# Patient Record
Sex: FEMALE | Race: WHITE | NOT HISPANIC OR LATINO | Employment: UNEMPLOYED | URBAN - METROPOLITAN AREA
[De-identification: names, ages, dates, MRNs, and addresses within clinical notes are randomized per-mention and may not be internally consistent; named-entity substitution may affect disease eponyms.]

---

## 2017-01-05 ENCOUNTER — COMMUNICATION - HEALTHEAST (OUTPATIENT)
Dept: NEUROLOGY | Facility: CLINIC | Age: 57
End: 2017-01-05

## 2017-01-25 ENCOUNTER — HOSPITAL ENCOUNTER (OUTPATIENT)
Dept: NEUROLOGY | Facility: CLINIC | Age: 57
Setting detail: THERAPIES SERIES
Discharge: STILL ADMITTED OR ADMINISTRATIVE | End: 2017-01-25
Attending: NURSE PRACTITIONER

## 2017-01-25 DIAGNOSIS — F06.4 ANXIETY DISORDER DUE TO MEDICAL CONDITION: ICD-10-CM

## 2017-02-16 ENCOUNTER — HOSPITAL ENCOUNTER (OUTPATIENT)
Dept: NEUROLOGY | Facility: CLINIC | Age: 57
Setting detail: THERAPIES SERIES
Discharge: STILL ADMITTED OR ADMINISTRATIVE | End: 2017-02-16
Attending: NURSE PRACTITIONER

## 2017-02-16 DIAGNOSIS — F98.8 ADD (ATTENTION DEFICIT DISORDER): ICD-10-CM

## 2017-03-09 ENCOUNTER — HOSPITAL ENCOUNTER (OUTPATIENT)
Dept: NEUROLOGY | Facility: CLINIC | Age: 57
Setting detail: THERAPIES SERIES
Discharge: STILL ADMITTED OR ADMINISTRATIVE | End: 2017-03-09

## 2017-03-09 ENCOUNTER — HOSPITAL ENCOUNTER (OUTPATIENT)
Dept: NEUROLOGY | Facility: CLINIC | Age: 57
Setting detail: THERAPIES SERIES
Discharge: STILL ADMITTED OR ADMINISTRATIVE | End: 2017-03-09
Attending: NURSE PRACTITIONER

## 2017-03-09 DIAGNOSIS — F43.23 ADJUSTMENT DISORDER WITH MIXED ANXIETY AND DEPRESSED MOOD: ICD-10-CM

## 2017-03-09 DIAGNOSIS — F33.1 MAJOR DEPRESSIVE DISORDER, RECURRENT EPISODE, MODERATE (H): ICD-10-CM

## 2017-03-09 DIAGNOSIS — F98.8 ADD (ATTENTION DEFICIT DISORDER): ICD-10-CM

## 2017-03-09 DIAGNOSIS — F32.A DEPRESSION, UNSPECIFIED DEPRESSION TYPE: ICD-10-CM

## 2017-03-13 ENCOUNTER — AMBULATORY - HEALTHEAST (OUTPATIENT)
Dept: SPEECH THERAPY | Age: 57
End: 2017-03-13

## 2017-04-19 ENCOUNTER — HOSPITAL ENCOUNTER (OUTPATIENT)
Dept: NEUROLOGY | Facility: CLINIC | Age: 57
Setting detail: THERAPIES SERIES
Discharge: STILL ADMITTED OR ADMINISTRATIVE | End: 2017-04-19
Attending: NURSE PRACTITIONER

## 2017-04-19 DIAGNOSIS — F07.81 POST CONCUSSION SYNDROME: ICD-10-CM

## 2017-04-19 DIAGNOSIS — G44.309 POST-CONCUSSION HEADACHE: ICD-10-CM

## 2017-05-10 ENCOUNTER — HOSPITAL ENCOUNTER (OUTPATIENT)
Dept: NEUROLOGY | Facility: CLINIC | Age: 57
Setting detail: THERAPIES SERIES
Discharge: STILL ADMITTED OR ADMINISTRATIVE | End: 2017-05-10
Attending: NURSE PRACTITIONER

## 2017-05-10 DIAGNOSIS — F43.23 ADJUSTMENT DISORDER WITH MIXED ANXIETY AND DEPRESSED MOOD: ICD-10-CM

## 2017-05-31 ENCOUNTER — HOSPITAL ENCOUNTER (OUTPATIENT)
Dept: NEUROLOGY | Facility: CLINIC | Age: 57
Setting detail: THERAPIES SERIES
Discharge: STILL ADMITTED OR ADMINISTRATIVE | End: 2017-05-31
Attending: NURSE PRACTITIONER

## 2017-05-31 DIAGNOSIS — G44.309 POST-CONCUSSION HEADACHE: ICD-10-CM

## 2017-05-31 DIAGNOSIS — F07.81 POST CONCUSSION SYNDROME: ICD-10-CM

## 2017-05-31 DIAGNOSIS — F43.23 ADJUSTMENT DISORDER WITH MIXED ANXIETY AND DEPRESSED MOOD: ICD-10-CM

## 2017-06-20 ENCOUNTER — COMMUNICATION - HEALTHEAST (OUTPATIENT)
Dept: NEUROLOGY | Facility: CLINIC | Age: 57
End: 2017-06-20

## 2017-06-20 DIAGNOSIS — F06.4 ANXIETY DISORDER DUE TO MEDICAL CONDITION: ICD-10-CM

## 2017-08-14 ENCOUNTER — COMMUNICATION - HEALTHEAST (OUTPATIENT)
Dept: NEUROLOGY | Facility: CLINIC | Age: 57
End: 2017-08-14

## 2017-08-14 DIAGNOSIS — F06.4 ANXIETY DISORDER DUE TO MEDICAL CONDITION: ICD-10-CM

## 2017-12-06 ENCOUNTER — COMMUNICATION - HEALTHEAST (OUTPATIENT)
Dept: NEUROLOGY | Facility: CLINIC | Age: 57
End: 2017-12-06

## 2017-12-06 DIAGNOSIS — F43.22 ADJUSTMENT DISORDER WITH ANXIETY: ICD-10-CM

## 2017-12-07 ENCOUNTER — COMMUNICATION - HEALTHEAST (OUTPATIENT)
Dept: NEUROLOGY | Facility: CLINIC | Age: 57
End: 2017-12-07

## 2017-12-07 DIAGNOSIS — F06.4 ANXIETY DISORDER DUE TO MEDICAL CONDITION: ICD-10-CM

## 2018-01-21 ENCOUNTER — COMMUNICATION - HEALTHEAST (OUTPATIENT)
Dept: NEUROLOGY | Facility: CLINIC | Age: 58
End: 2018-01-21

## 2018-01-21 DIAGNOSIS — F43.23 ADJUSTMENT DISORDER WITH MIXED ANXIETY AND DEPRESSED MOOD: ICD-10-CM

## 2019-10-30 ENCOUNTER — OFFICE VISIT (OUTPATIENT)
Dept: URGENT CARE | Facility: URGENT CARE | Age: 59
End: 2019-10-30
Payer: COMMERCIAL

## 2019-10-30 VITALS
TEMPERATURE: 98.2 F | HEART RATE: 60 BPM | OXYGEN SATURATION: 99 % | SYSTOLIC BLOOD PRESSURE: 102 MMHG | WEIGHT: 245 LBS | BODY MASS INDEX: 35.07 KG/M2 | DIASTOLIC BLOOD PRESSURE: 68 MMHG | HEIGHT: 70 IN | RESPIRATION RATE: 12 BRPM

## 2019-10-30 DIAGNOSIS — J22 LRTI (LOWER RESPIRATORY TRACT INFECTION): Primary | ICD-10-CM

## 2019-10-30 PROCEDURE — 99204 OFFICE O/P NEW MOD 45 MIN: CPT | Performed by: INTERNAL MEDICINE

## 2019-10-30 RX ORDER — AZITHROMYCIN 250 MG/1
TABLET, FILM COATED ORAL
Qty: 6 TABLET | Refills: 0 | Status: SHIPPED | OUTPATIENT
Start: 2019-10-30 | End: 2020-08-15

## 2019-10-30 RX ORDER — CODEINE PHOSPHATE AND GUAIFENESIN 10; 100 MG/5ML; MG/5ML
1-2 SOLUTION ORAL
Qty: 120 ML | Refills: 0 | Status: SHIPPED | OUTPATIENT
Start: 2019-10-30 | End: 2020-08-15

## 2019-10-30 ASSESSMENT — ENCOUNTER SYMPTOMS
WHEEZING: 0
ADENOPATHY: 0
NAUSEA: 0
SINUS PRESSURE: 1
SLEEP DISTURBANCE: 1
EYE DISCHARGE: 0
MYALGIAS: 0
HEADACHES: 1
FEVER: 0
SHORTNESS OF BREATH: 0

## 2019-10-30 ASSESSMENT — MIFFLIN-ST. JEOR: SCORE: 1763.62

## 2019-10-30 NOTE — PROGRESS NOTES
SUBJECTIVE:   Gracie Parra is a 59 year old female presenting with a chief complaint of   Chief Complaint   Patient presents with     Urgent Care     URI     sick with coughing over 2 weeks, congestion, headache and lack of appetite, sneezing.        She is a new patient of [facility].    URI Adult    Onset of symptoms was 2 week(s) ago.  Course of illness is worsening.      Current and Associated symptoms: cough - productive green mucous, fatigue   Treatment measures tried include OTC Cough med.  Predisposing factors include ill contact: Family member  and HX of asthma.        Review of Systems   Constitutional: Negative for fever.   HENT: Positive for sinus pressure.    Eyes: Negative for discharge.   Respiratory: Negative for shortness of breath and wheezing.    Cardiovascular: Negative for chest pain.   Gastrointestinal: Negative for nausea.   Genitourinary: Negative for decreased urine volume.   Musculoskeletal: Negative for myalgias.   Skin: Negative for rash.   Allergic/Immunologic: Negative for environmental allergies.   Neurological: Positive for headaches.   Hematological: Negative for adenopathy.   Psychiatric/Behavioral: Positive for sleep disturbance.       Past Medical History:   Diagnosis Date     ADHD      Asthma      Depression      Hypertension      Psoriasis      Family History   Problem Relation Age of Onset     Brain Cancer Mother      Alzheimer Disease Father      Current Outpatient Medications   Medication Sig Dispense Refill     azithromycin (ZITHROMAX) 250 MG tablet Two tablets first day, then one tablet daily for four days. 6 tablet 0     guaiFENesin-codeine (ROBITUSSIN AC) 100-10 MG/5ML solution Take 5-10 mLs by mouth nightly as needed for cough 120 mL 0     metoprolol (TOPROL XL) 100 MG 24 hr tablet Take 200 mg by mouth daily.       Naproxen 500 MG TBEC Take  by mouth daily.       buPROPion (WELLBUTRIN XL) 300 MG 24 hr tablet Take 1 tablet by mouth every morning. (Patient not  "taking: Reported on 10/30/2019) 90 tablet 3     busPIRone (BUSPAR) 15 MG tablet Take 1 tablet by mouth 2 times daily. (Patient not taking: Reported on 10/30/2019) 180 tablet 1     cholecalciferol (VITAMIN D) 1000 UNIT tablet Take 2 tablets by mouth daily. (Patient not taking: Reported on 10/30/2019) 200 tablet 3     methylphenidate (CONCERTA) 36 MG CR tablet Take 2 tablets by mouth every morning. 60 tablet 0     methylphenidate (CONCERTA) 36 MG CR tablet Take 2 tablets by mouth every morning. 60 tablet 0     methylphenidate (CONCERTA) 36 MG CR tablet Take 2 tablets by mouth every morning. 60 tablet 0     methylphenidate (CONCERTA) 36 MG CR tablet Take 2 tablets by mouth every morning. (Patient not taking: Reported on 10/30/2019) 60 tablet 0     venlafaxine (EFFEXOR-ER) 225 MG TB24 Take 1 tablet by mouth daily (with breakfast). (Patient not taking: Reported on 10/30/2019) 90 each 0     Social History     Tobacco Use     Smoking status: Never Smoker     Smokeless tobacco: Never Used   Substance Use Topics     Alcohol use: Yes     Alcohol/week: 0.0 standard drinks     Comment: 4 days/month: 1 Etoh drink at time.       OBJECTIVE  /68   Pulse 60   Temp 98.2  F (36.8  C) (Oral)   Resp 12   Ht 1.765 m (5' 9.5\")   Wt 111.1 kg (245 lb)   SpO2 99%   BMI 35.66 kg/m      Physical Exam  Vitals signs reviewed.   Constitutional:       Appearance: Normal appearance.   HENT:      Right Ear: Tympanic membrane normal.      Left Ear: Tympanic membrane normal.      Nose: Nose normal.      Comments: Mild sinus tenderness with palpation maxillary and frontal     Mouth/Throat:      Mouth: Mucous membranes are moist.      Pharynx: No oropharyngeal exudate.   Eyes:      General:         Right eye: No discharge.         Left eye: No discharge.      Conjunctiva/sclera: Conjunctivae normal.   Neck:      Musculoskeletal: No muscular tenderness.   Cardiovascular:      Rate and Rhythm: Normal rate and regular rhythm.      Pulses: " Normal pulses.      Heart sounds: Normal heart sounds.   Pulmonary:      Effort: Pulmonary effort is normal.      Breath sounds: Normal breath sounds.   Abdominal:      Palpations: Abdomen is soft.      Tenderness: There is no tenderness.   Lymphadenopathy:      Cervical: No cervical adenopathy.   Skin:     Findings: No rash.   Neurological:      Mental Status: She is alert.         Labs:  No results found for this or any previous visit (from the past 24 hour(s)).    X-Ray was not done.    ASSESSMENT:      ICD-10-CM    1. LRTI (lower respiratory tract infection) J22 guaiFENesin-codeine (ROBITUSSIN AC) 100-10 MG/5ML solution     azithromycin (ZITHROMAX) 250 MG tablet        Medical Decision Making:    Differential Diagnosis:  URI Adult/Peds:  Asthma exacerbation, Bronchitis-viral and Pneumonia    Serious Comorbid Conditions:  Adult:  Asthma     Currently no active wheezing.  Patient has a very old inhaler.  I did offer her albuterol inhaler and she declined.  She did not feel that she needed one with this illness    PLAN:    URI Adult:  Tylenol, Ibuprofen, Fluids, Rest, Saline gargles and   zpak   Robitussin AC bedtime    Followup:    If not improving or if condition worsens, follow up with your Primary Care Provider

## 2020-08-13 ENCOUNTER — VIRTUAL VISIT (OUTPATIENT)
Dept: FAMILY MEDICINE | Facility: OTHER | Age: 60
End: 2020-08-13
Payer: COMMERCIAL

## 2020-08-13 PROCEDURE — 99421 OL DIG E/M SVC 5-10 MIN: CPT | Performed by: PHYSICIAN ASSISTANT

## 2020-08-13 NOTE — PROGRESS NOTES
"Date: 2020 09:02:17  Clinician: Lee Covarrubias  Clinician NPI: [id number]  Patient: Gracie Parra  Patient : 1960  Patient Address: [address], [place] [address]  Patient Phone: [phone]  Visit Protocol: Ear pain  Patient Summary:  Gracie is a 60 year old ( : 1960 ) female who initiated a Visit for swimmer's ear (outer ear infection). When asked the question \"Please sign me up to receive news, health information and promotions from [employer].\", Gracie responded \"No\".    Gracie uploaded images of her ear(s).   Gracie reports that her ear pain started 2-4 days ago. The ear pain is located outside (external surface) and inside the right ear.   In addition to the ear pain, Gracie is experiencing redness, a feeling of fullness, and tenderness in the ear(s). Her ear(s) is tender to the touch on the ear canal and mastoid process.   Symptom Details   Pain: Gracie is experiencing moderate pain (4-6 on a 10 point pain scale). It gets worse when she eats or chews. It does not get worse when she gently pulls on the earlobe(s).    Gracie denies itchiness in the ear(s). She also denies feeling feverish, the possibility of a foreign object in the ear(s), ever having ear tubes, and having fluid draining from the ear(s).   Precipitating events  Gracie reports swimming within the past week. Additionally, Gracie has experienced a recent injury in the area around her ear. Injury as reported by the patient (free text): Psoriasis scab    Gracie denies flying within the past week.   Pertinent medical history    Weight: 240 lbs   She does not smoke or use smokeless tobacco.   Weight: 240 lbs    MEDICATIONS: Naprosyn oral, metoprolol succinate oral, ALLERGIES: NKDA  Clinician Response:  Dear Gracie,   Based upon the information you provided, you may have otitis externa. External otitis, also known as swimmer's ear, is a condition that occurs when the ear canal becomes irritated or infected.   " Medication information  I am prescribing:     Neomycin-polymyxin-hydrocortisone 1% otic ear drops. Instill 4 drops into affected ear(s) 3 times per day for 7 days. There are no refills with this prescription.   Instructions for using eardrops:     Lie on your side or tilt your head    Insert the drops into your ear canal    Lie on your side or insert a cotton ball in the ear canal for 5 minutes    Use the medication for the number of days recommended, even if you begin to feel better within a few days after starting the drops     Self care  Avoid getting water inside your ear while you are being treated for swimmer's ear.  Use a cotton ball coated with petroleum jelly to protect your ears when bathing and showering.  Do not go swimming or diving for the next 7-10 days.  Do not wear headphones or hearing aid in the infected ears until your symptoms improve.  When to seek care  Please make an appointment to be seen in a clinic or urgent care if any of the following occur:     Symptoms do not improve within 2 days    New symptoms develop or symptoms becomes worse      Diagnosis: Otitis externa  Diagnosis ICD: H60.399  Prescription: neomycin-polymyxin-HC 3.5-10,000-1 mg/mL-unit/mL-% otic (ear) drops,suspension 1 10 ml dropper bottle, 7 days supply. Instill 4 drops into affected ear(s) 3 times per day for 7 days. Refills: 0, Refill as needed: no, Allow substitutions: yes  Pharmacy: [id number] - [phone] - [address], [place] [address]

## 2020-08-15 ENCOUNTER — OFFICE VISIT (OUTPATIENT)
Dept: URGENT CARE | Facility: URGENT CARE | Age: 60
End: 2020-08-15
Payer: COMMERCIAL

## 2020-08-15 VITALS
WEIGHT: 245 LBS | SYSTOLIC BLOOD PRESSURE: 133 MMHG | TEMPERATURE: 98.8 F | DIASTOLIC BLOOD PRESSURE: 87 MMHG | BODY MASS INDEX: 35.07 KG/M2 | HEART RATE: 55 BPM | OXYGEN SATURATION: 97 % | HEIGHT: 70 IN

## 2020-08-15 DIAGNOSIS — H60.501 ACUTE OTITIS EXTERNA OF RIGHT EAR, UNSPECIFIED TYPE: Primary | ICD-10-CM

## 2020-08-15 DIAGNOSIS — I10 BENIGN ESSENTIAL HYPERTENSION: ICD-10-CM

## 2020-08-15 PROCEDURE — 99213 OFFICE O/P EST LOW 20 MIN: CPT | Performed by: PHYSICIAN ASSISTANT

## 2020-08-15 RX ORDER — METOPROLOL SUCCINATE 100 MG/1
100 TABLET, EXTENDED RELEASE ORAL DAILY
Qty: 30 TABLET | Refills: 0 | Status: SHIPPED | OUTPATIENT
Start: 2020-08-15 | End: 2020-11-06

## 2020-08-15 ASSESSMENT — MIFFLIN-ST. JEOR: SCORE: 1758.62

## 2020-08-15 NOTE — PROGRESS NOTES
"SUBJECTIVE:  Gracie Parra is a 60 year old female who presents with right ear pain and swelling. Patient has a history of psoriasis in ear and has occasionally had otitis externa. Currently being treated with cortisporin for otitis externa but has increased swelling and pain.     Patient denies fever, chills, decreased hearing, tinnitus, pain on the outer ear, recent URI symptoms or recent swimming.       Past Medical History:   Diagnosis Date     ADHD      Asthma      Depression      Hypertension      Psoriasis      Current Outpatient Medications   Medication Sig Dispense Refill     amoxicillin-clavulanate (AUGMENTIN) 875-125 MG tablet Take 1 tablet by mouth 2 times daily for 10 days 20 tablet 0     metoprolol (TOPROL XL) 100 MG 24 hr tablet Take 200 mg by mouth daily.       metoprolol succinate ER (TOPROL-XL) 100 MG 24 hr tablet Take 1 tablet (100 mg) by mouth daily 30 tablet 0     Naproxen 500 MG TBEC Take  by mouth daily.       Social History     Tobacco Use     Smoking status: Never Smoker     Smokeless tobacco: Never Used   Substance Use Topics     Alcohol use: Yes     Alcohol/week: 0.0 standard drinks     Comment: 4 days/month: 1 Etoh drink at time.       ROS:   Review of systems negative except as stated above.    OBJECTIVE:  /87   Pulse 55   Temp 98.8  F (37.1  C) (Oral)   Ht 1.765 m (5' 9.5\")   Wt 111.1 kg (245 lb)   SpO2 97%   BMI 35.66 kg/m     EXAM:  The right TM is normal: no effusions, no erythema, and normal landmarks     The right auditory canal is erythematous, swollen and tender  The left TM is normal: no effusions, no erythema, and normal landmarks  The left auditory canal is normal and without drainage, edema or erythema  Oropharynx exam is normal: no lesions, erythema, adenopathy or exudate.  GENERAL: no acute distress  EYES: EOMI,  PERRL, conjunctiva clear  NECK: supple, non-tender to palpation, no adenopathy noted  RESP: lungs clear to auscultation - no rales, rhonchi " or wheezes  CV: regular rates and rhythm, normal S1 S2, no murmur noted  SKIN: no suspicious lesions or rashes     ASSESSMENT / PLAN:  1. Acute otitis externa of right ear, unspecified type  Patient with otitis externa currently being treated with drops, now with increased pain and swelling. Mild erythema noted on exam and I suspect early cellulitis. NO evidence of mastoiditis. Will treat with medication as below.   - amoxicillin-clavulanate (AUGMENTIN) 875-125 MG tablet; Take 1 tablet by mouth 2 times daily for 10 days  Dispense: 20 tablet; Refill: 0    2. Benign essential hypertension  Patients medical clinic has closed. Refill of metoprolol given  - metoprolol succinate ER (TOPROL-XL) 100 MG 24 hr tablet; Take 1 tablet (100 mg) by mouth daily  Dispense: 30 tablet; Refill: 0    Diagnosis and treatment plan was reviewed with patient and/or family.   We went over any labs or imaging. Discussed worsening symptoms or little to no relief despite treatment plan to follow-up with PCP or return to clinic.  Patient verbalizes understanding. All questions were addressed and answered.   Valeria Phipps PA-C

## 2020-08-20 ENCOUNTER — NURSE TRIAGE (OUTPATIENT)
Dept: NURSING | Facility: CLINIC | Age: 60
End: 2020-08-20

## 2020-08-20 NOTE — TELEPHONE ENCOUNTER
Called regarding worsened ringing in the right ear.  States she had an ear infection about 2 weeks ago, currently on antibiotic for this.  Caller states that it is painful lying on the right side and interferes with her sleep.  Advised to be seen in the office within 3 days per protocol/care advice.  Yojana Remy RN  COVID 19 Nurse Triage Plan/Patient Instructions    Please be aware that novel coronavirus (COVID-19) may be circulating in the community. If you develop symptoms such as fever, cough, or SOB or if you have concerns about the presence of another infection including coronavirus (COVID-19), please contact your health care provider or visit [email or web address].     Disposition/Instructions    In-Person Visit with provider recommended. Reference Visit Selection Guide.    Thank you for taking steps to prevent the spread of this virus.  o Limit your contact with others.  o Wear a simple mask to cover your cough.  o Wash your hands well and often.    Resources    [facility]: About COVID-19: [email or web address]    CDC: What to Do If You're Sick: www.cdc.gov/coronavirus/2019-ncov/about/steps-when-sick.html    CDC: Ending Home Isolation: www.cdc.gov/coronavirus/2019-ncov/hcp/disposition-in-home-patients.html     CDC: Caring for Someone: www.cdc.gov/coronavirus/2019-ncov/if-you-are-sick/care-for-someone.html     [facility]: Interim Guidance for Hospital Discharge to Home: www.health.[facility].[place]/diseases/coronavirus/hcp/hospdischarge.pdf    [facility] clinical trials (COVID-19 research studies): clinicalaffairs.[facility]/n-clinical-trials     Below are the COVID-19 hotlines at the [place] Department of [facility] ([facility]). Interpreters are available.   o For health questions: Call 835-801-3177 or 1-808.954.1314 (7 a.m. to 7 p.m.)  o For questions about schools and childcare: Call 234-411-9936 or 1-642.853.5403 (7 a.m. to 7 p.m.)                       Additional Information    Negative: Followed an  ear injury    Negative: Hearing loss is main symptom    Negative: Patient sounds very sick or weak to the triager    Negative: Taking aspirin and dosage sounds high (i.e., > 1500 mg/day)    Negative: Hearing loss in one or both ears and sudden onset and present now    Negative: Ear is painful    Negative: Decreased hearing followed sudden, extremely loud noise (e.g., explosion, not just loud concert)    Negative: Taking medication that can damage hearing (i.e., gentamycin, tobramycin, furosemide, ethacrynic acid, cisplatin, quinidine)    Negative: MODERATE-SEVERE tinnitus (i.e., interferes with work, school, or sleep)    Symptoms only or mainly in 1 ear (unilateral tinnitus)    Protocols used: TINNITUS-A-OH

## 2020-08-21 ENCOUNTER — OFFICE VISIT (OUTPATIENT)
Dept: FAMILY MEDICINE | Facility: CLINIC | Age: 60
End: 2020-08-21
Payer: COMMERCIAL

## 2020-08-21 VITALS
WEIGHT: 239 LBS | HEART RATE: 51 BPM | OXYGEN SATURATION: 96 % | DIASTOLIC BLOOD PRESSURE: 59 MMHG | BODY MASS INDEX: 34.79 KG/M2 | SYSTOLIC BLOOD PRESSURE: 105 MMHG | TEMPERATURE: 96.3 F

## 2020-08-21 DIAGNOSIS — R53.83 OTHER FATIGUE: ICD-10-CM

## 2020-08-21 DIAGNOSIS — H60.391 INFECTIVE OTITIS EXTERNA, RIGHT: Primary | ICD-10-CM

## 2020-08-21 DIAGNOSIS — I10 ESSENTIAL HYPERTENSION: ICD-10-CM

## 2020-08-21 PROCEDURE — 99214 OFFICE O/P EST MOD 30 MIN: CPT | Performed by: FAMILY MEDICINE

## 2020-08-21 RX ORDER — CIPROFLOXACIN 500 MG/1
500 TABLET, FILM COATED ORAL 2 TIMES DAILY
Qty: 14 TABLET | Refills: 0 | Status: SHIPPED | OUTPATIENT
Start: 2020-08-21 | End: 2020-08-28

## 2020-08-21 ASSESSMENT — PATIENT HEALTH QUESTIONNAIRE - PHQ9: SUM OF ALL RESPONSES TO PHQ QUESTIONS 1-9: 11

## 2020-08-21 NOTE — PROGRESS NOTES
Subjective     Gracie Parra is a 60 year old female who presents to clinic today for the following health issues:    HPI     Right ear pain x 12 days    Right ear pain despite taking abx for OM.   She does endorse right neck pain.   She previously had tinnitus which is now more pronounced in the right ear.   No fever, chills, ear drainage, shortness of breath or cough.   She feels more tired since ear infection.   She is sleeping more.   Appetite decreased.   She is trying to stay hydrated       Review of Systems   Constitutional, HEENT, cardiovascular, pulmonary, gi and gu systems are negative, except as otherwise noted.      Objective    There were no vitals taken for this visit.  There is no height or weight on file to calculate BMI.  Physical Exam   /59   Pulse 51   Temp 96.3  F (35.7  C) (Tympanic)   Wt 108.4 kg (239 lb)   SpO2 96%   BMI 34.79 kg/m    GENERAL: healthy, alert and no distress  EYES: Eyes grossly normal to inspection  HENT: right ear canal with mild edema and TM normal  NECK: no adenopathy, no asymmetry, masses, or scars and thyroid normal to palpation  RESP: lungs clear to auscultation - no rales, rhonchi or wheezes  CV: regular rate and rhythm, normal S1 S2    No results found for this or any previous visit (from the past 24 hour(s)).        Assessment & Plan       1. Infective otitis externa, right  - advised below  Continue ear drops.  Avoid getting water, qtips or ear buds in the ear.  Please stop Augmentin and start ciprofloxacin 500 mg twice daily for 7 days. Please take with food.   Please call or message me on Monday if symptoms are not improving.   - ciprofloxacin (CIPRO) 500 MG tablet; Take 1 tablet (500 mg) by mouth 2 times daily for 7 days  Dispense: 14 tablet; Refill: 0    2. Essential hypertension  - B/P very controlled  - advised to monitor B/P  - if B/P continues to be low then will reduce mediation     3. Other fatigue  - Symptomatic COVID-19 Virus  (Coronavirus) by PCR; Future        Return in about 2 days (around 8/23/2020) for sympotms are not improving.    Hannah Guillen MD  [facility]

## 2020-08-21 NOTE — PATIENT INSTRUCTIONS
Continue ear drops.  Avoid getting water, qtips or ear buds in the ear.  Please stop Augmentin and start ciprofloxacin 500 mg twice daily for 7 days. Please take with food.   Please call or message me on Monday if symptoms are not improving.

## 2020-08-27 DIAGNOSIS — R53.83 OTHER FATIGUE: ICD-10-CM

## 2020-08-27 PROCEDURE — U0003 INFECTIOUS AGENT DETECTION BY NUCLEIC ACID (DNA OR RNA); SEVERE ACUTE RESPIRATORY SYNDROME CORONAVIRUS 2 (SARS-COV-2) (CORONAVIRUS DISEASE [COVID-19]), AMPLIFIED PROBE TECHNIQUE, MAKING USE OF HIGH THROUGHPUT TECHNOLOGIES AS DESCRIBED BY CMS-2020-01-R: HCPCS | Performed by: FAMILY MEDICINE

## 2020-08-28 LAB
SARS-COV-2 RNA SPEC QL NAA+PROBE: NOT DETECTED
SPECIMEN SOURCE: NORMAL

## 2020-11-06 ENCOUNTER — VIRTUAL VISIT (OUTPATIENT)
Dept: FAMILY MEDICINE | Facility: CLINIC | Age: 60
End: 2020-11-06
Payer: COMMERCIAL

## 2020-11-06 DIAGNOSIS — Z13.220 LIPID SCREENING: Primary | ICD-10-CM

## 2020-11-06 DIAGNOSIS — I10 BENIGN ESSENTIAL HYPERTENSION: ICD-10-CM

## 2020-11-06 DIAGNOSIS — M19.90 ARTHRITIS: ICD-10-CM

## 2020-11-06 PROCEDURE — 99214 OFFICE O/P EST MOD 30 MIN: CPT | Mod: 95 | Performed by: FAMILY MEDICINE

## 2020-11-06 RX ORDER — NAPROXEN 500 MG/1
500 TABLET ORAL DAILY PRN
Qty: 90 TABLET | Refills: 1 | Status: SHIPPED | OUTPATIENT
Start: 2020-11-06 | End: 2021-05-02

## 2020-11-06 RX ORDER — METOPROLOL SUCCINATE 100 MG/1
100 TABLET, EXTENDED RELEASE ORAL DAILY
Qty: 90 TABLET | Refills: 1 | Status: SHIPPED | OUTPATIENT
Start: 2020-11-06 | End: 2021-05-02

## 2020-11-06 NOTE — PROGRESS NOTES
"Gracie Parra is a 60 year old female who is being evaluated via a billable video visit.      The patient has been notified of following:     \"This video visit will be conducted via a call between you and your physician/provider. We have found that certain health care needs can be provided without the need for an in-person physical exam.  This service lets us provide the care you need with a video conversation.  If a prescription is necessary we can send it directly to your pharmacy.  If lab work is needed we can place an order for that and you can then stop by our lab to have the test done at a later time.    Video visits are billed at different rates depending on your insurance coverage.  Please reach out to your insurance provider with any questions.    If during the course of the call the physician/provider feels a video visit is not appropriate, you will not be charged for this service.\"    Patient has given verbal consent for Video visit? Yes  How would you like to obtain your AVS? MyChart  If you are dropped from the video visit, the video invite should be resent to: Text to cell phone: [phone]  Will anyone else be joining your video visit? No    Subjective     Gracie Parra is a 60 year old female who presents today via video visit for the following health issues:    HPI     Hypertension Follow-up      Do you check your blood pressure regularly outside of the clinic? No     Are you following a low salt diet? No    Are your blood pressures ever more than 140 on the top number (systolic) OR more   than 90 on the bottom number (diastolic), for example 140/90? Unknown       How many servings of fruits and vegetables do you eat daily?  4 or more    On average, how many sweetened beverages do you drink each day (Examples: soda, juice, sweet tea, etc.  Do NOT count diet or artificially sweetened beverages)?   1    How many days per week do you exercise enough to make your heart beat faster? " 3 or less    How many minutes a day do you exercise enough to make your heart beat faster? 20 - 29    How many days per week do you miss taking your medication? 0    Arthritis hands, knees and back -  Pt takes Naproxen 500 mg once daily or every other day. No side effects.       Video Start Time: 4:26 PM        Review of Systems   Constitutional, HEENT, cardiovascular, pulmonary, gi and gu systems are negative, except as otherwise noted.      Objective           Vitals:  No vitals were obtained today due to virtual visit.    Physical Exam     GENERAL: Healthy, alert and no distress  EYES: Eyes grossly normal to inspection.  No discharge or erythema, or obvious scleral/conjunctival abnormalities.  RESP: No audible wheeze, cough, or visible cyanosis.  No visible retractions or increased work of breathing.    SKIN: Visible skin clear. No significant rash, abnormal pigmentation or lesions.  NEURO: Cranial nerves grossly intact.  Mentation and speech appropriate for age.  PSYCH: Mentation appears normal, affect normal/bright, judgement and insight intact, normal speech and appearance well-groomed.      No results found for this or any previous visit (from the past 24 hour(s)).        1. Benign essential hypertension  - stable   - Comprehensive metabolic panel (BMP + Alb, Alk Phos, ALT, AST, Total. Bili, TP); Future  - Albumin Random Urine Quantitative with Creat Ratio; Future  - metoprolol succinate ER (TOPROL-XL) 100 MG 24 hr tablet; Take 1 tablet (100 mg) by mouth daily  Dispense: 90 tablet; Refill: 1    2. Arthritis  - stable  - naproxen (NAPROSYN) 500 MG tablet; Take 1 tablet (500 mg) by mouth daily as needed for moderate pain  Dispense: 90 tablet; Refill: 1  - CBC with platelets; Future    3. Lipid screening  - Lipid Profile (Chol, Trig, HDL, LDL calc); Future    Follow up in 6 months for physical     Video-Visit Details    Type of service:  Video Visit    Video End Time:4:32 PM    Originating Location (pt.  Location): Home    Distant Location (provider location):  [facility]     Platform used for Video Visit: Barry

## 2020-11-17 DIAGNOSIS — Z13.220 LIPID SCREENING: ICD-10-CM

## 2020-11-17 DIAGNOSIS — M19.90 ARTHRITIS: ICD-10-CM

## 2020-11-17 DIAGNOSIS — I10 BENIGN ESSENTIAL HYPERTENSION: ICD-10-CM

## 2020-11-17 LAB
ALBUMIN SERPL-MCNC: 3.7 G/DL (ref 3.4–5)
ALP SERPL-CCNC: 82 U/L (ref 40–150)
ALT SERPL W P-5'-P-CCNC: 22 U/L (ref 0–50)
ANION GAP SERPL CALCULATED.3IONS-SCNC: 5 MMOL/L (ref 3–14)
AST SERPL W P-5'-P-CCNC: 16 U/L (ref 0–45)
BILIRUB SERPL-MCNC: 0.8 MG/DL (ref 0.2–1.3)
BUN SERPL-MCNC: 19 MG/DL (ref 7–30)
CALCIUM SERPL-MCNC: 9 MG/DL (ref 8.5–10.1)
CHLORIDE SERPL-SCNC: 107 MMOL/L (ref 94–109)
CHOLEST SERPL-MCNC: 237 MG/DL
CO2 SERPL-SCNC: 26 MMOL/L (ref 20–32)
CREAT SERPL-MCNC: 0.88 MG/DL (ref 0.52–1.04)
CREAT UR-MCNC: 97 MG/DL
ERYTHROCYTE [DISTWIDTH] IN BLOOD BY AUTOMATED COUNT: 13.4 % (ref 10–15)
GFR SERPL CREATININE-BSD FRML MDRD: 71 ML/MIN/{1.73_M2}
GLUCOSE SERPL-MCNC: 94 MG/DL (ref 70–99)
HCT VFR BLD AUTO: 39.8 % (ref 35–47)
HDLC SERPL-MCNC: 46 MG/DL
HGB BLD-MCNC: 13.8 G/DL (ref 11.7–15.7)
LDLC SERPL CALC-MCNC: 121 MG/DL
MCH RBC QN AUTO: 30.4 PG (ref 26.5–33)
MCHC RBC AUTO-ENTMCNC: 34.7 G/DL (ref 31.5–36.5)
MCV RBC AUTO: 88 FL (ref 78–100)
MICROALBUMIN UR-MCNC: <5 MG/L
MICROALBUMIN/CREAT UR: NORMAL MG/G CR (ref 0–25)
NONHDLC SERPL-MCNC: 191 MG/DL
PLATELET # BLD AUTO: 154 10E9/L (ref 150–450)
POTASSIUM SERPL-SCNC: 4.3 MMOL/L (ref 3.4–5.3)
PROT SERPL-MCNC: 7.2 G/DL (ref 6.8–8.8)
RBC # BLD AUTO: 4.54 10E12/L (ref 3.8–5.2)
SODIUM SERPL-SCNC: 138 MMOL/L (ref 133–144)
TRIGL SERPL-MCNC: 350 MG/DL
WBC # BLD AUTO: 7.2 10E9/L (ref 4–11)

## 2020-11-17 PROCEDURE — 82043 UR ALBUMIN QUANTITATIVE: CPT | Performed by: FAMILY MEDICINE

## 2020-11-17 PROCEDURE — 85027 COMPLETE CBC AUTOMATED: CPT | Performed by: FAMILY MEDICINE

## 2020-11-17 PROCEDURE — 80061 LIPID PANEL: CPT | Performed by: FAMILY MEDICINE

## 2020-11-17 PROCEDURE — 80053 COMPREHEN METABOLIC PANEL: CPT | Performed by: FAMILY MEDICINE

## 2020-11-17 PROCEDURE — 36415 COLL VENOUS BLD VENIPUNCTURE: CPT | Performed by: FAMILY MEDICINE

## 2020-11-17 NOTE — RESULT ENCOUNTER NOTE
Excellent! Please call or sent a [employer] message if you have any questions. Karla Boudreaux M.D.

## 2021-01-15 ENCOUNTER — HEALTH MAINTENANCE LETTER (OUTPATIENT)
Age: 61
End: 2021-01-15

## 2021-03-27 ENCOUNTER — IMMUNIZATION (OUTPATIENT)
Dept: NURSING | Facility: CLINIC | Age: 61
End: 2021-03-27
Payer: COMMERCIAL

## 2021-03-27 PROCEDURE — 0011A PR COVID VAC MODERNA 100 MCG/0.5 ML IM: CPT

## 2021-03-27 PROCEDURE — 91301 PR COVID VAC MODERNA 100 MCG/0.5 ML IM: CPT

## 2021-04-24 ENCOUNTER — IMMUNIZATION (OUTPATIENT)
Dept: NURSING | Facility: CLINIC | Age: 61
End: 2021-04-24
Attending: INTERNAL MEDICINE
Payer: COMMERCIAL

## 2021-04-24 PROCEDURE — 0012A PR COVID VAC MODERNA 100 MCG/0.5 ML IM: CPT

## 2021-04-24 PROCEDURE — 91301 PR COVID VAC MODERNA 100 MCG/0.5 ML IM: CPT

## 2021-05-01 DIAGNOSIS — I10 BENIGN ESSENTIAL HYPERTENSION: ICD-10-CM

## 2021-05-01 DIAGNOSIS — M19.90 ARTHRITIS: ICD-10-CM

## 2021-05-02 RX ORDER — METOPROLOL SUCCINATE 100 MG/1
TABLET, EXTENDED RELEASE ORAL
Qty: 90 TABLET | Refills: 0 | Status: SHIPPED | OUTPATIENT
Start: 2021-05-02 | End: 2021-05-21

## 2021-05-02 RX ORDER — NAPROXEN 500 MG/1
TABLET ORAL
Qty: 90 TABLET | Refills: 0 | Status: SHIPPED | OUTPATIENT
Start: 2021-05-02 | End: 2021-08-03

## 2021-05-02 NOTE — TELEPHONE ENCOUNTER
---Prescription approved per [facility].       Loretta Loja RN BSN     [facility]        --Last visit:  11/6/2020     --Future Visit: none

## 2021-05-06 ENCOUNTER — OFFICE VISIT (OUTPATIENT)
Dept: FAMILY MEDICINE | Facility: CLINIC | Age: 61
End: 2021-05-06
Payer: COMMERCIAL

## 2021-05-06 VITALS
BODY MASS INDEX: 37.41 KG/M2 | HEART RATE: 57 BPM | RESPIRATION RATE: 16 BRPM | SYSTOLIC BLOOD PRESSURE: 120 MMHG | DIASTOLIC BLOOD PRESSURE: 72 MMHG | WEIGHT: 257 LBS | TEMPERATURE: 96.4 F | OXYGEN SATURATION: 97 %

## 2021-05-06 DIAGNOSIS — R53.83 OTHER FATIGUE: Primary | ICD-10-CM

## 2021-05-06 DIAGNOSIS — Z11.59 ENCOUNTER FOR HEPATITIS C SCREENING TEST FOR LOW RISK PATIENT: ICD-10-CM

## 2021-05-06 DIAGNOSIS — Z11.4 ENCOUNTER FOR SCREENING FOR HIV: ICD-10-CM

## 2021-05-06 DIAGNOSIS — F33.1 MODERATE EPISODE OF RECURRENT MAJOR DEPRESSIVE DISORDER (H): ICD-10-CM

## 2021-05-06 DIAGNOSIS — Z12.31 ENCOUNTER FOR SCREENING MAMMOGRAM FOR BREAST CANCER: ICD-10-CM

## 2021-05-06 DIAGNOSIS — Z12.11 COLON CANCER SCREENING: ICD-10-CM

## 2021-05-06 DIAGNOSIS — F90.9 ATTENTION DEFICIT HYPERACTIVITY DISORDER (ADHD), UNSPECIFIED ADHD TYPE: ICD-10-CM

## 2021-05-06 LAB
ALBUMIN SERPL-MCNC: 3.9 G/DL (ref 3.4–5)
ALP SERPL-CCNC: 77 U/L (ref 40–150)
ALT SERPL W P-5'-P-CCNC: 19 U/L (ref 0–50)
ANION GAP SERPL CALCULATED.3IONS-SCNC: 4 MMOL/L (ref 3–14)
AST SERPL W P-5'-P-CCNC: 14 U/L (ref 0–45)
BILIRUB SERPL-MCNC: 1 MG/DL (ref 0.2–1.3)
BUN SERPL-MCNC: 18 MG/DL (ref 7–30)
CALCIUM SERPL-MCNC: 9.2 MG/DL (ref 8.5–10.1)
CHLORIDE SERPL-SCNC: 107 MMOL/L (ref 94–109)
CO2 SERPL-SCNC: 28 MMOL/L (ref 20–32)
CREAT SERPL-MCNC: 0.81 MG/DL (ref 0.52–1.04)
ERYTHROCYTE [DISTWIDTH] IN BLOOD BY AUTOMATED COUNT: 14 % (ref 10–15)
GFR SERPL CREATININE-BSD FRML MDRD: 79 ML/MIN/{1.73_M2}
GLUCOSE SERPL-MCNC: 109 MG/DL (ref 70–99)
HBA1C MFR BLD: 4.4 % (ref 0–5.6)
HCT VFR BLD AUTO: 33.2 % (ref 35–47)
HGB BLD-MCNC: 11.4 G/DL (ref 11.7–15.7)
MCH RBC QN AUTO: 28.9 PG (ref 26.5–33)
MCHC RBC AUTO-ENTMCNC: 34.3 G/DL (ref 31.5–36.5)
MCV RBC AUTO: 84 FL (ref 78–100)
PLATELET # BLD AUTO: 147 10E9/L (ref 150–450)
POTASSIUM SERPL-SCNC: 4.5 MMOL/L (ref 3.4–5.3)
PROT SERPL-MCNC: 7.3 G/DL (ref 6.8–8.8)
RBC # BLD AUTO: 3.95 10E12/L (ref 3.8–5.2)
SODIUM SERPL-SCNC: 139 MMOL/L (ref 133–144)
TSH SERPL DL<=0.005 MIU/L-ACNC: 2.58 MU/L (ref 0.4–4)
WBC # BLD AUTO: 4.5 10E9/L (ref 4–11)

## 2021-05-06 PROCEDURE — 80053 COMPREHEN METABOLIC PANEL: CPT | Performed by: FAMILY MEDICINE

## 2021-05-06 PROCEDURE — 99214 OFFICE O/P EST MOD 30 MIN: CPT | Performed by: FAMILY MEDICINE

## 2021-05-06 PROCEDURE — 36415 COLL VENOUS BLD VENIPUNCTURE: CPT | Performed by: FAMILY MEDICINE

## 2021-05-06 PROCEDURE — 84443 ASSAY THYROID STIM HORMONE: CPT | Performed by: FAMILY MEDICINE

## 2021-05-06 PROCEDURE — 85027 COMPLETE CBC AUTOMATED: CPT | Performed by: FAMILY MEDICINE

## 2021-05-06 PROCEDURE — 87389 HIV-1 AG W/HIV-1&-2 AB AG IA: CPT | Performed by: FAMILY MEDICINE

## 2021-05-06 PROCEDURE — 82306 VITAMIN D 25 HYDROXY: CPT | Performed by: FAMILY MEDICINE

## 2021-05-06 PROCEDURE — 83036 HEMOGLOBIN GLYCOSYLATED A1C: CPT | Performed by: FAMILY MEDICINE

## 2021-05-06 PROCEDURE — 86803 HEPATITIS C AB TEST: CPT | Performed by: FAMILY MEDICINE

## 2021-05-06 RX ORDER — BUPROPION HYDROCHLORIDE 150 MG/1
150 TABLET ORAL EVERY MORNING
Qty: 30 TABLET | Refills: 1 | Status: SHIPPED | OUTPATIENT
Start: 2021-05-06 | End: 2021-07-07

## 2021-05-06 RX ORDER — SERTRALINE HYDROCHLORIDE 25 MG/1
25 TABLET, FILM COATED ORAL DAILY
Qty: 14 TABLET | Refills: 0 | Status: SHIPPED | OUTPATIENT
Start: 2021-05-06 | End: 2021-09-03

## 2021-05-06 ASSESSMENT — ANXIETY QUESTIONNAIRES
7. FEELING AFRAID AS IF SOMETHING AWFUL MIGHT HAPPEN: SEVERAL DAYS
2. NOT BEING ABLE TO STOP OR CONTROL WORRYING: NOT AT ALL
1. FEELING NERVOUS, ANXIOUS, OR ON EDGE: SEVERAL DAYS
4. TROUBLE RELAXING: NOT AT ALL
7. FEELING AFRAID AS IF SOMETHING AWFUL MIGHT HAPPEN: SEVERAL DAYS
GAD7 TOTAL SCORE: 5
5. BEING SO RESTLESS THAT IT IS HARD TO SIT STILL: NOT AT ALL
GAD7 TOTAL SCORE: 5
GAD7 TOTAL SCORE: 5
6. BECOMING EASILY ANNOYED OR IRRITABLE: MORE THAN HALF THE DAYS
3. WORRYING TOO MUCH ABOUT DIFFERENT THINGS: SEVERAL DAYS

## 2021-05-06 ASSESSMENT — PATIENT HEALTH QUESTIONNAIRE - PHQ9
10. IF YOU CHECKED OFF ANY PROBLEMS, HOW DIFFICULT HAVE THESE PROBLEMS MADE IT FOR YOU TO DO YOUR WORK, TAKE CARE OF THINGS AT HOME, OR GET ALONG WITH OTHER PEOPLE: VERY DIFFICULT
SUM OF ALL RESPONSES TO PHQ QUESTIONS 1-9: 16
SUM OF ALL RESPONSES TO PHQ QUESTIONS 1-9: 16

## 2021-05-06 NOTE — PATIENT INSTRUCTIONS
Mammogram [phone]    Voltaren - over the counter topical ant-inflammatory       Zoloft , Wellbutrin   - sertraline (ZOLOFT) 25 MG tablet; Take 1 tablet (25 mg) by mouth daily for 14 days    - sertraline (ZOLOFT) 50 MG tablet; Take 1 tablet (50 mg) by mouth daily for 14 days, THEN 1.5 tablets (75 mg) daily for 14 days  - buPROPion (WELLBUTRIN XL) 150 MG 24 hr tablet; Take 1 tablet (150 mg) by mouth every morning

## 2021-05-06 NOTE — PROGRESS NOTES
Assessment & Plan     1. Other fatigue  - Recently donated blood and hemoglobin levels were normal.  D/d anemia vs diabetes mellitus II vs electrolyte abnormality vs thyroid vs avitaminosis D vs MDD vs other etiology. Ordered below for further evaluation and tx as indicated.   - CBC with platelets  - Comprehensive metabolic panel (BMP + Alb, Alk Phos, ALT, AST, Total. Bili, TP)  - TSH with free T4 reflex  - Vitamin D Deficiency  - Hemoglobin A1c  - CBC with platelets; Future  - Ferritin; Future  - Iron and iron binding capacity; Future  - CBC with platelets; Future  - Vitamin B12; Future  - Folate; Future  - Homocysteine; Future  - Methylmalonic Acid; Future    2. Colon cancer screening  - COLOGUARD([employer])    3. Encounter for screening mammogram for breast cancer  - *MA Screening Digital Bilateral; Future    4. Encounter for screening for HIV  - HIV Antigen Antibody Combo    5. Encounter for hepatitis C screening test for low risk patient  - Hepatitis C Screen Reflex to HCV RNA Quant and Genotype    6. Moderate episode of recurrent major depressive disorder (H)  - pt interested in restarting below medication  - discuss s/e of medication  - sertraline (ZOLOFT) 25 MG tablet; Take 1 tablet (25 mg) by mouth daily for 14 days  Dispense: 14 tablet; Refill: 0  - sertraline (ZOLOFT) 50 MG tablet; Take 1 tablet (50 mg) by mouth daily for 14 days, THEN 1.5 tablets (75 mg) daily for 14 days.  Dispense: 35 tablet; Refill: 0  - buPROPion (WELLBUTRIN XL) 150 MG 24 hr tablet; Take 1 tablet (150 mg) by mouth every morning  Dispense: 30 tablet; Refill: 1    7. Attention deficit hyperactivity disorder (ADHD), unspecified ADHD type  - buPROPion (WELLBUTRIN XL) 150 MG 24 hr tablet; Take 1 tablet (150 mg) by mouth every morning  Dispense: 30 tablet; Refill: 1          Return in about 6 weeks (around 6/17/2021) for Routine Visit.    Hannah Guillen MD  [facility]    Buddy Bowman is a  61 year old who presents for the following health issues     HPI     Depression Followup    How are you doing with your depression since your last visit? Worsened trying to figure out the cause of extreme fatigue     Are you having other symptoms that might be associated with depression? Yes:  extreme fatigue lethargic and lack of motivation    Have you had a significant life event?  No     Are you feeling anxious or having panic attacks?   No    Do you have any concerns with your use of alcohol or other drugs? No    Social History     Tobacco Use     Smoking status: Never Smoker     Smokeless tobacco: Never Used   Substance Use Topics     Alcohol use: Yes     Alcohol/week: 0.0 standard drinks     Comment: 4 days/month: 1 Etoh drink at time.     Drug use: No     PHQ 8/21/2020 5/6/2021   PHQ-9 Total Score 11 16   Q9: Thoughts of better off dead/self-harm past 2 weeks Not at all Not at all     RAFAELA-7 SCORE 5/6/2021   Total Score 5 (mild anxiety)   Total Score 5     Last PHQ-9 5/6/2021   1.  Little interest or pleasure in doing things 3   2.  Feeling down, depressed, or hopeless 2   3.  Trouble falling or staying asleep, or sleeping too much 2   4.  Feeling tired or having little energy 3   5.  Poor appetite or overeating 2   6.  Feeling bad about yourself 1   7.  Trouble concentrating 2   8.  Moving slowly or restless 1   Q9: Thoughts of better off dead/self-harm past 2 weeks 0   PHQ-9 Total Score 16   Difficulty at work, home, or with people -     RAFAELA-7  5/6/2021   1. Feeling nervous, anxious, or on edge 1   2. Not being able to stop or control worrying 0   3. Worrying too much about different things 1   4. Trouble relaxing 0   5. Being so restless that it is hard to sit still 0   6. Becoming easily annoyed or irritable 2   7. Feeling afraid, as if something awful might happen 1   RAFAELA-7 Total Score 5       Suicide Assessment Five-step Evaluation and Treatment (SAFE-T)        How many servings of fruits and vegetables  do you eat daily?  4 or more    On average, how many sweetened beverages do you drink each day (Examples: soda, juice, sweet tea, etc.  Do NOT count diet or artificially sweetened beverages)?   0    How many days per week do you exercise enough to make your heart beat faster? 3 or less    How many minutes a day do you exercise enough to make your heart beat faster? 10 - 19    How many days per week do you miss taking your medication? 0      H/o depression for most of her life. She has been off antidepressants for 3 years. She has been experiencing fatigue, lethargy and lack of motivation. This feels different to her as this is a physical fatigue. She had a remodel and had a pool. She has been swimming six days/week. She has been swimming for 40 minutes. In the last month or so, she does not have the energy to swim. After her vaccination, she was pretty tired. S/p hysterectomy 5 years due to uterine cancer. No radiation or chemo. She was followed by [facility] and due for follow up.   She has had problems with sleep, getting to sleep. She is sleeping 8-9 hours. Snoring but no apnea.   ADHD - she was previously on concerta but currently not taking medication. She would like to try non-stimulant.   No fever, chills, night sweats, cough or abdominal pain.   No OTC supplements.   She is taking naproxen daily for years.   Previously Zoloft worked.   Chronic knee pain - hoping to get replacement, last xray showed bone on bone. She will go through [facility].     Review of Systems         Objective    There were no vitals taken for this visit.  There is no height or weight on file to calculate BMI.  Physical Exam   GENERAL: healthy, alert and no distress  RESP: lungs clear to auscultation - no rales, rhonchi or wheezes  CV: regular rate and rhythm, normal S1 S2  PSYCH: mentation appears normal, affect normal

## 2021-05-07 ENCOUNTER — TRANSFERRED RECORDS (OUTPATIENT)
Dept: HEALTH INFORMATION MANAGEMENT | Facility: CLINIC | Age: 61
End: 2021-05-07

## 2021-05-07 LAB
DEPRECATED CALCIDIOL+CALCIFEROL SERPL-MC: 26 UG/L (ref 20–75)
HCV AB SERPL QL IA: NONREACTIVE
HIV 1+2 AB+HIV1 P24 AG SERPL QL IA: NONREACTIVE

## 2021-05-07 ASSESSMENT — ANXIETY QUESTIONNAIRES: GAD7 TOTAL SCORE: 5

## 2021-05-11 ENCOUNTER — TRANSFERRED RECORDS (OUTPATIENT)
Dept: HEALTH INFORMATION MANAGEMENT | Facility: CLINIC | Age: 61
End: 2021-05-11

## 2021-05-18 ENCOUNTER — OFFICE VISIT (OUTPATIENT)
Dept: FAMILY MEDICINE | Facility: CLINIC | Age: 61
End: 2021-05-18
Payer: COMMERCIAL

## 2021-05-18 VITALS
SYSTOLIC BLOOD PRESSURE: 112 MMHG | BODY MASS INDEX: 35.81 KG/M2 | WEIGHT: 250.12 LBS | HEIGHT: 70 IN | HEART RATE: 50 BPM | TEMPERATURE: 96.6 F | OXYGEN SATURATION: 96 % | DIASTOLIC BLOOD PRESSURE: 64 MMHG

## 2021-05-18 DIAGNOSIS — I10 ESSENTIAL HYPERTENSION: ICD-10-CM

## 2021-05-18 DIAGNOSIS — F90.9 ATTENTION DEFICIT HYPERACTIVITY DISORDER (ADHD), UNSPECIFIED ADHD TYPE: ICD-10-CM

## 2021-05-18 DIAGNOSIS — F33.1 MODERATE EPISODE OF RECURRENT MAJOR DEPRESSIVE DISORDER (H): ICD-10-CM

## 2021-05-18 DIAGNOSIS — Z01.818 PREOP GENERAL PHYSICAL EXAM: Primary | ICD-10-CM

## 2021-05-18 DIAGNOSIS — M17.12 PRIMARY OSTEOARTHRITIS OF LEFT KNEE: ICD-10-CM

## 2021-05-18 LAB
ERYTHROCYTE [DISTWIDTH] IN BLOOD BY AUTOMATED COUNT: 13.1 % (ref 10–15)
HCT VFR BLD AUTO: 36.1 % (ref 35–47)
HGB BLD-MCNC: 12 G/DL (ref 11.7–15.7)
MCH RBC QN AUTO: 27.6 PG (ref 26.5–33)
MCHC RBC AUTO-ENTMCNC: 33.2 G/DL (ref 31.5–36.5)
MCV RBC AUTO: 83 FL (ref 78–100)
PLATELET # BLD AUTO: 157 10E9/L (ref 150–450)
RBC # BLD AUTO: 4.35 10E12/L (ref 3.8–5.2)
WBC # BLD AUTO: 4.5 10E9/L (ref 4–11)

## 2021-05-18 PROCEDURE — 36415 COLL VENOUS BLD VENIPUNCTURE: CPT | Performed by: FAMILY MEDICINE

## 2021-05-18 PROCEDURE — 85027 COMPLETE CBC AUTOMATED: CPT | Performed by: FAMILY MEDICINE

## 2021-05-18 PROCEDURE — 93000 ELECTROCARDIOGRAM COMPLETE: CPT | Performed by: FAMILY MEDICINE

## 2021-05-18 PROCEDURE — 99214 OFFICE O/P EST MOD 30 MIN: CPT | Performed by: FAMILY MEDICINE

## 2021-05-18 RX ORDER — METOPROLOL SUCCINATE 25 MG/1
75 TABLET, EXTENDED RELEASE ORAL DAILY
Qty: 42 TABLET | Refills: 0 | Status: SHIPPED | OUTPATIENT
Start: 2021-05-18 | End: 2021-05-21

## 2021-05-18 ASSESSMENT — MIFFLIN-ST. JEOR: SCORE: 1776.85

## 2021-05-18 NOTE — PATIENT INSTRUCTIONS
No Advil one day before surgery or Aleve 4 days before surgery.  No Aspirin 7 days before surgery.  Ok to take medications on the morning of the surgery with small sip of water.     Please message me in 2 weeks if symptoms are stable then I'll refill your medications for six months.      Preparing for Your Surgery  Getting started  A nurse will call you to review your health history and instructions. They will give you an arrival time based on your scheduled surgery time.  Please be ready to share the following:    Your doctor's clinic name and phone number    Your medical, surgical and anesthesia history    A list of allergies and sensitivities    A list of medicines, including herbal treatments and over-the-counter drugs    Whether the patient has a legal guardian (ask how to send us the papers in advance)  If you have a child who's having surgery, please ask for a copy of Preparing for Your Child's Surgery.    Preparing for surgery    Within 30 days of surgery: Have a pre-op exam (sometimes called an H&P, or History and Physical). This can be done at a clinic or pre-operative center.  ? If you're having a , you may not need this exam. Talk to your care team    At your pre-op exam, talk to your care team about all medicines you take. If you need to stop any medicines before surgery, ask when to start taking them again.  ? We do this for your safety. Many medicines can make you bleed too much during surgery. Some change how well surgery (anesthesia) drugs work.    Call your insurance company to let them know you're having surgery. (If you don't have insurance, call [phone].)    Call your clinic if there's any change in your health. This includes signs of a cold or flu (sore throat, runny nose, cough, rash, fever). It also includes a scrape or scratch near the surgery site.    If you have questions on the day of surgery, call your hospital or surgery center.  Eating and drinking guidelines  For your  safety: Unless your surgeon tells you otherwise, follow the guidelines below.    Eat and drink as usual until 8 hours before surgery. After that, no food or milk.    Drink clear liquids until 2 hours before surgery. These are liquids you can see through, like water, Gatorade and Propel Water. You may also have black coffee and tea (no cream or milk).    Nothing by mouth within 2 hours of surgery. This includes gum, candy and breath mints.    If you drink, stop drinking alcohol the night before surgery.    If your care team tells you to take medicine on the morning of surgery, it's okay to take it with a sip of water.  Preventing infection    Shower or bathe the night before and morning of your surgery. Follow the instructions your clinic gave you. (If no instructions, use regular soap.)    Don't shave or clip hair near your surgery site. We'll remove the hair if needed.    Don't smoke or vape the morning of surgery. You may chew nicotine gum up to 2 hours before surgery. A nicotine patch is okay.  ? Note: Some surgeries require you to completely quit smoking and nicotine. Check with your surgeon.    Your care team will make every effort to keep you safe from infection. We will:  ? Clean our hands often with soap and water (or an alcohol-based hand rub).  ? Clean the skin at your surgery site with a special soap that kills germs.  ? Give you a special gown to keep you warm. (Cold raises the risk of infection.)  ? Wear special hair covers, masks, gowns and gloves during surgery.  ? Give antibiotic medicine, if prescribed. Not all surgeries need antibiotics.  What to bring on the day of surgery    Photo ID and insurance card    Copy of your health care directive, if you have one    Glasses and hearing aides (bring cases)  ? You can't wear contacts during surgery    Inhaler and eye drops, if you use them (tell us about these when you arrive)    CPAP machine or breathing device, if you use them    A few personal items,  if spending the night    If you have . . .  ? A pacemaker or ICD (cardiac defibrillator): Bring the ID card.  ? An implanted stimulator: Bring the remote control.  ? A legal guardian: Bring a copy of the certified (court-stamped) guardianship papers.  Please remove any jewelry, including body piercings. Leave jewelry and other valuables at home.  If you're going home the day of surgery  Important: If you don't follow the rules below, we must cancel your surgery.     Arrange for someone to drive you home after surgery. You may not drive, take a taxi or take public transportation by yourself (unless you'll have local anesthesia only).    Arrange for a responsible adult to stay with you overnight. If you don't, we may keep you in the hospital overnight, and you may need to pay the costs yourself.  Questions?   If you have any questions for your care team, list them here: _________________________________________________________________________________________________________________________________________________________________________________________________________________________________________________________________________________________________________________________  For informational purposes only. Not to replace the advice of your health care provider. Copyright   2003, 2019 [facility]. All rights reserved. Clinically reviewed by Smita Lackey MD. [employer] [id number] - REV 4/20.

## 2021-05-18 NOTE — PROGRESS NOTES
[facility] [address] [place] [address]  Phone: [phone]  Primary Provider: Jah Guillen  Pre-op Performing Provider: JAH GUILLEN      PREOPERATIVE EVALUATION:  Today's date: 5/18/2021    Gracie Parra is a 61 year old female who presents for a preoperative evaluation.    Surgical Information:  Surgery/Procedure: Left TKA   Surgery Location: [facility]   Surgeon:    Surgery Date: 5/24/2021  Time of Surgery: tbd   Where patient plans to recover: At home with family  Fax number for surgical facility: [phone]    Type of Anesthesia Anticipated: to be determined    Assessment & Plan     The proposed surgical procedure is considered INTERMEDIATE risk.    1. Preop general physical exam  - EKG 12-lead complete w/read - Clinics  - CBC with platelets    2. Primary osteoarthritis of left knee    3. Essential hypertension  - BP low with bradycardia, will decrease her Metoprolol dose below and MA B/P check on 5/24/21   - metoprolol succinate ER (TOPROL-XL) 25 MG 24 hr tablet; Take 3 tablets (75 mg) by mouth daily for 14 days  Dispense: 42 tablet; Refill: 0  - advised below  5/18/21 and 5/19/21 - Metoprolol 75 mg daily   5/20/21 - If B/p 120-139/70-89 then stay on that dose  If B/P is less than decrease to 50 mg daily     4. Moderate episode of recurrent major depressive disorder (H)  - stable     5. Attention deficit hyperactivity disorder (ADHD), unspecified ADHD type  - stable          Risks and Recommendations:  The patient has the following additional risks and recommendations for perioperative complications:   - No identified additional risk factors other than previously addressed    Medication Instructions:  Patient is to take all scheduled medications on the day of surgery   - ibuprofen (Advil, Motrin): HOLD 1 day before surgery.    - naproxen (Aleve, Naprosyn): HOLD 4 days before surgery.     RECOMMENDATION:  APPROVAL GIVEN to proceed  with proposed procedure, without further diagnostic evaluation.    Addendum B/P stable 5/21. Continue metoprolol XL 50 mg daiy.     Subjective     HPI related to upcoming procedure: chronic left knee pain scheduled for knee replacement     Preop Questions 5/13/2021   1. Have you ever had a heart attack or stroke? No   2. Have you ever had surgery on your heart or blood vessels, such as a stent placement, a coronary artery bypass, or surgery on an artery in your head, neck, heart, or legs? No   3. Do you have chest pain with activity? No   4. Do you have a history of  heart failure? No   5. Do you currently have a cold, bronchitis or symptoms of other infection? No   6. Do you have a cough, shortness of breath, or wheezing? No   7. Do you or anyone in your family have previous history of blood clots? No   8. Do you or does anyone in your family have a serious bleeding problem such as prolonged bleeding following surgeries or cuts? No   9. Have you ever had problems with anemia or been told to take iron pills? YES - anemia was low at her last visit    10. Have you had any abnormal blood loss such as black, tarry or bloody stools, or abnormal vaginal bleeding? No   11. Have you ever had a blood transfusion? No   12. Are you willing to have a blood transfusion if it is medically needed before, during, or after your surgery? Yes   13. Have you or any of your relatives ever had problems with anesthesia? No   14. Do you have sleep apnea, excessive snoring or daytime drowsiness? No   15. Do you have any artifical heart valves or other implanted medical devices like a pacemaker, defibrillator, or continuous glucose monitor? No   16. Do you have artificial joints? No   17. Are you allergic to latex? No   18. Is there any chance that you may be pregnant? No       Health Care Directive:  Patient does not have a Health Care Directive or Living Will: Discussed advance care planning with patient; information given to patient to  review.    Preoperative Review of :   reviewed - controlled substances prescribed by other outside provider(s).      MDD/ ADHD - She feels that mood and fatigue symptoms have improved.   HTN - controlled       Review of Systems  CONSTITUTIONAL: NEGATIVE for fever, chills, change in weight  INTEGUMENTARY/SKIN: NEGATIVE for worrisome rashes, moles or lesions  EYES: NEGATIVE for vision changes or irritation  ENT/MOUTH: NEGATIVE for ear, mouth and throat problems  RESP: NEGATIVE for significant cough or SOB  BREAST: NEGATIVE for masses, tenderness or discharge  CV: NEGATIVE for chest pain, palpitations or peripheral edema  GI: NEGATIVE for nausea, abdominal pain, heartburn, or change in bowel habits  : NEGATIVE for frequency, dysuria, or hematuria  MUSCULOSKELETAL: + left knee pain  NEURO: NEGATIVE for weakness, dizziness or paresthesias  ENDOCRINE: NEGATIVE for temperature intolerance, skin/hair changes  HEME: NEGATIVE for bleeding problems  PSYCHIATRIC: NEGATIVE for changes in mood or affect    Patient Active Problem List    Diagnosis Date Noted     Vitamin D deficiency 08/13/2012     Priority: Medium     Major depressive disorder, recurrent (H) 06/12/2012     Priority: Medium     ADHD, predominantly inattentive type 06/12/2012     Priority: Medium     Anxiety state 06/12/2012     Priority: Medium     Problem list name updated by automated process. Provider to review       Eating disorder 06/12/2012     Priority: Medium     Problem list name updated by automated process. Provider to review        Past Medical History:   Diagnosis Date     ADHD      Asthma      Depression      Hypertension      Psoriasis      Past Surgical History:   Procedure Laterality Date     HYSTERECTOMY       Current Outpatient Medications   Medication Sig Dispense Refill     buPROPion (WELLBUTRIN XL) 150 MG 24 hr tablet Take 1 tablet (150 mg) by mouth every morning 30 tablet 1     metoprolol succinate ER (TOPROL-XL) 100 MG 24 hr tablet  TAKE 1 TABLET(100 MG) BY MOUTH DAILY 90 tablet 0     naproxen (NAPROSYN) 500 MG tablet TAKE 1 TABLET(500 MG) BY MOUTH DAILY AS NEEDED FOR MODERATE PAIN 90 tablet 0     sertraline (ZOLOFT) 25 MG tablet Take 1 tablet (25 mg) by mouth daily for 14 days 14 tablet 0     sertraline (ZOLOFT) 50 MG tablet Take 1 tablet (50 mg) by mouth daily for 14 days, THEN 1.5 tablets (75 mg) daily for 14 days. 35 tablet 0       Allergies   Allergen Reactions     Lisinopril Cough        Social History     Tobacco Use     Smoking status: Never Smoker     Smokeless tobacco: Never Used   Substance Use Topics     Alcohol use: Yes     Alcohol/week: 0.0 standard drinks     Comment: 4 days/month: 1 Etoh drink at time.     Family History   Problem Relation Age of Onset     Brain Cancer Mother      Other Cancer Mother         Glioblastoma     Anxiety Disorder Mother      Thyroid Disease Mother      Alzheimer Disease Father      Diabetes Father      Hypertension Father      Prostate Cancer Father      Mental Illness Father         Bipolar     Obesity Father      History   Drug Use No         Objective     LMP  (LMP Unknown)     Physical Exam    GENERAL APPEARANCE: healthy, alert and no distress     EYES: EOMI     HENT: ear canals and TM's normal and nose and mouth without ulcers or lesions     NECK: no adenopathy, no asymmetry, masses, or scars and thyroid normal to palpation     RESP: lungs clear to auscultation - no rales, rhonchi or wheezes     CV: regular rates and rhythm, normal S1 S2     ABDOMEN:  soft, nontender, no HSM or masses and bowel sounds normal     SKIN: no suspicious lesions or rashes     NEURO: Normal strength and tone, sensory exam grossly normal, mentation intact and speech normal     PSYCH: mentation appears normal. and affect normal/bright     LYMPHATICS: No cervical adenopathy    Recent Labs   Lab Test 05/06/21  1208 11/17/20  0927   HGB 11.4* 13.8   * 154    138   POTASSIUM 4.5 4.3   CR 0.81 0.88   A1C 4.4  --          Diagnostics:  Recent Results (from the past 168 hour(s))   CBC with platelets    Collection Time: 05/18/21 12:14 PM   Result Value Ref Range    WBC 4.5 4.0 - 11.0 10e9/L    RBC Count 4.35 3.8 - 5.2 10e12/L    Hemoglobin 12.0 11.7 - 15.7 g/dL    Hematocrit 36.1 35.0 - 47.0 %    MCV 83 78 - 100 fl    MCH 27.6 26.5 - 33.0 pg    MCHC 33.2 31.5 - 36.5 g/dL    RDW 13.1 10.0 - 15.0 %    Platelet Count 157 150 - 450 10e9/L      EKG: sinus bradycardia, normal axis, normal intervals, no acute ST/T changes c/w ischemia, no LVH by voltage criteria, unchanged from previous tracings    Revised Cardiac Risk Index (RCRI):  The patient has the following serious cardiovascular risks for perioperative complications:   - No serious cardiac risks = 0 points     RCRI Interpretation: 0 points: Class I (very low risk - 0.4% complication rate)           Signed Electronically by: Hannah Guillen MD  Copy of this evaluation report is provided to requesting physician.

## 2021-05-21 ENCOUNTER — ALLIED HEALTH/NURSE VISIT (OUTPATIENT)
Dept: NURSING | Facility: CLINIC | Age: 61
End: 2021-05-21
Payer: COMMERCIAL

## 2021-05-21 VITALS — HEART RATE: 57 BPM | OXYGEN SATURATION: 97 % | SYSTOLIC BLOOD PRESSURE: 120 MMHG | DIASTOLIC BLOOD PRESSURE: 68 MMHG

## 2021-05-21 DIAGNOSIS — I10 ESSENTIAL HYPERTENSION: ICD-10-CM

## 2021-05-21 PROCEDURE — 99207 PR NO CHARGE NURSE ONLY: CPT

## 2021-05-21 RX ORDER — METOPROLOL SUCCINATE 25 MG/1
75 TABLET, EXTENDED RELEASE ORAL DAILY
Qty: 90 TABLET | Refills: 2 | Status: SHIPPED | OUTPATIENT
Start: 2021-05-21 | End: 2021-08-02

## 2021-05-21 NOTE — PROGRESS NOTES
Continue current medication  Follow up after surgery with home b/p  If b/p still low then can decrease to 50 mg daily.  DM

## 2021-05-21 NOTE — PROGRESS NOTES
Gracie Parra is a 61 year old patient who comes in today for a Blood Pressure check.  Initial BP:  LMP  (LMP Unknown)      Data Unavailable  Disposition: provider notified while patient in the clinic

## 2021-05-24 ENCOUNTER — TRANSFERRED RECORDS (OUTPATIENT)
Dept: HEALTH INFORMATION MANAGEMENT | Facility: CLINIC | Age: 61
End: 2021-05-24

## 2021-05-30 VITALS — WEIGHT: 283 LBS | BODY MASS INDEX: 41.79 KG/M2

## 2021-05-30 VITALS — WEIGHT: 273 LBS | BODY MASS INDEX: 40.32 KG/M2

## 2021-05-30 VITALS — BODY MASS INDEX: 41.05 KG/M2 | WEIGHT: 278 LBS

## 2021-05-31 VITALS — BODY MASS INDEX: 42.38 KG/M2 | WEIGHT: 287 LBS

## 2021-05-31 VITALS — WEIGHT: 285 LBS | BODY MASS INDEX: 42.09 KG/M2

## 2021-06-03 DIAGNOSIS — F33.1 MODERATE EPISODE OF RECURRENT MAJOR DEPRESSIVE DISORDER (H): ICD-10-CM

## 2021-06-07 NOTE — TELEPHONE ENCOUNTER
Routing refill request to provider for review/approval because:   --Last PHQ-9 >4.        PHQ 8/21/2020 5/6/2021   PHQ-9 Total Score 11 16   Q9: Thoughts of better off dead/self-harm past 2 weeks Not at all Not at all

## 2021-06-08 ENCOUNTER — MYC MEDICAL ADVICE (OUTPATIENT)
Dept: FAMILY MEDICINE | Facility: CLINIC | Age: 61
End: 2021-06-08

## 2021-06-08 ENCOUNTER — TRANSFERRED RECORDS (OUTPATIENT)
Dept: HEALTH INFORMATION MANAGEMENT | Facility: CLINIC | Age: 61
End: 2021-06-08

## 2021-06-08 ASSESSMENT — PATIENT HEALTH QUESTIONNAIRE - PHQ9
SUM OF ALL RESPONSES TO PHQ QUESTIONS 1-9: 17
SUM OF ALL RESPONSES TO PHQ QUESTIONS 1-9: 17
10. IF YOU CHECKED OFF ANY PROBLEMS, HOW DIFFICULT HAVE THESE PROBLEMS MADE IT FOR YOU TO DO YOUR WORK, TAKE CARE OF THINGS AT HOME, OR GET ALONG WITH OTHER PEOPLE: EXTREMELY DIFFICULT

## 2021-06-08 ASSESSMENT — ANXIETY QUESTIONNAIRES
4. TROUBLE RELAXING: NOT AT ALL
7. FEELING AFRAID AS IF SOMETHING AWFUL MIGHT HAPPEN: NOT AT ALL
7. FEELING AFRAID AS IF SOMETHING AWFUL MIGHT HAPPEN: NOT AT ALL
GAD7 TOTAL SCORE: 2
3. WORRYING TOO MUCH ABOUT DIFFERENT THINGS: NOT AT ALL
6. BECOMING EASILY ANNOYED OR IRRITABLE: SEVERAL DAYS
5. BEING SO RESTLESS THAT IT IS HARD TO SIT STILL: NOT AT ALL
1. FEELING NERVOUS, ANXIOUS, OR ON EDGE: SEVERAL DAYS
GAD7 TOTAL SCORE: 2
2. NOT BEING ABLE TO STOP OR CONTROL WORRYING: NOT AT ALL
GAD7 TOTAL SCORE: 2

## 2021-06-08 NOTE — PROGRESS NOTES
How have you been doing since we last saw you? any concerns?( new pt. Ask how concussion happen?) pt stated she wants to talk about her work schedule.       Current Symptoms : yes

## 2021-06-08 NOTE — PROGRESS NOTES
How have you been doing since we last saw you? any concerns?( new pt. Ask how concussion happen?) f/u appt little improvement from last appt, will need letter for work.       Current Symptoms : Yes
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
## 2021-06-09 ASSESSMENT — ANXIETY QUESTIONNAIRES: GAD7 TOTAL SCORE: 2

## 2021-06-09 ASSESSMENT — PATIENT HEALTH QUESTIONNAIRE - PHQ9: SUM OF ALL RESPONSES TO PHQ QUESTIONS 1-9: 17

## 2021-06-09 NOTE — TELEPHONE ENCOUNTER
PHQ 8/21/2020 5/6/2021 6/8/2021   PHQ-9 Total Score 11 16 17   Q9: Thoughts of better off dead/self-harm past 2 weeks Not at all Not at all Not at all     RAFAELA-7 SCORE 5/6/2021 6/8/2021   Total Score 5 (mild anxiety) 2 (minimal anxiety)   Total Score 5 2     Sent mychart- Phq 9 is worse-also need clarification of her current dose.
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
## 2021-06-09 NOTE — PROGRESS NOTES
"Psychology Progress Note    Date of Service:  3/9/2017  Duration:  35 minutes (3:00 PM - 3:35 PM)    Name:  Gracie Parra  :  1960  MRN:  [id number]    Necessity: This session is necessary to address depressed mood and anxiety.    Intervention: Patient reported that she is doing well and feels as if she \"has her self back.\" She noted that she is currently working full days Monday, Wednesday, and Friday, which she feels she has adapted well to and is enjoying. She stated that her long-term goal is to eventually return full-time, and that she is working toward that. Patient stated that her mood has improved and she denied overall anxiety. She discussed future plans including travel, potential return to grad school, and career opportunities. Writer provided support and encouragement. Patient asserted that she feels as if she no longer has a need for psychotherapy at this time, due to the progress she has made physically and psychologically. She will be considered discharged at this time unless needs should arise in the future.    Mental Status: Patient arrived on-time and was unaccompanied. She was casually dressed and neatly groomed. Patient appeared oriented to person, place, situation, and time, although orientation was not formally assessed.  Recent and remote memory, attention, concentration, language, and fund of knowledge are generally intact and unchanged from patient's baseline. Mood was described as \"good\" and affect appeared congruent and generally euthymic. Affect was significantly brighter than in previous sessions. There was no indication of SI/HI. Patient was cooperative and pleasant throughout session. No overall changes in mental status since initial consultation.    Participation: The patient was able to participate and benefit from treatment as evidenced by her verbal expression of ideas and initiation of topics discussed.    Psychotherapeutic Techniques: Cognitive-behavioral therapy, " motivational interviewing and supportive psychotherapy strategies were utilized.    Progress: The patient feels she has made good progress toward goals of managing anxiety and depressed mood throughout psychotherapy and no longer feels the need for ongoing psychotherapy. This writer agrees with patient's assessment and this will serve as patient's final session with this writer unless additional needs should arise in the future.    Diagnosis:  Adjustment Disorder with Mixed Anxiety and Depressed Mood; Major Depressive Disorder, Recurrent, Moderate    Plan: As patient has made good progress toward recovery including resolving depressed mood and anxiety/adjustment, writer and patient agreed to terminate psychotherapy at this time. Writer informed her that she would be welcome to return should additional needs related to concussion arise in the future. She was provided with contact information. Patient is considered discharged from psychotherapy at this time.      Provider Name:  Nirmala Castrejon PsyD, LP  Date:  3/9/2017  Time:  3:00 PM

## 2021-06-09 NOTE — TELEPHONE ENCOUNTER
PHQ 8/21/2020 5/6/2021 6/8/2021   PHQ-9 Total Score 11 16 17   Q9: Thoughts of better off dead/self-harm past 2 weeks Not at all Not at all Not at all

## 2021-06-09 NOTE — PROGRESS NOTES
Speech Language/Pathology   Discharge Summary    Gracie Parra  1960      [id number]   Referring Provider: Мария Paulson CNP  Date of Onset 09/16/2016  Start of Care 11/10/2016   Date of Last Visit 12/27/2017    The patient was seen for a total of 3 visits, but did not complete the prescribed plan of care.  Medical Diagnosis mTBI  Treatment Diagnosis cognition    Summary of Goals and Functional Progress  Long term goals: Pt will return to full time, competitive employment  Short term goals:   1) Pt will participate in formal higher level cognitive testing to determine specific areas of strength and weakness.NOT MET (pt was always too symptomatic to consider testing valid)  2) Pt will verbalize x3 strategies for implementing structure in her work day. MET  3) Pt will utilize external aids for memory and organization in 4 of 5 opportunities per txpist audit. NOT MET    Patient/family participated in education regarding treatment plan/rationale and verbalized understanding and agreement.       Plan  Discharge tx  Patient did not return     Physician Recommendation:  1. I certify the need for these services furnished within this plan and while under my care. I agree with the therapist's recommendation for plan of care.  2. If there is any recommendation for modification of therapy plan, please indicate below.

## 2021-06-09 NOTE — PROGRESS NOTES
How have you been doing since we last saw you? any concerns?( new pt. Ask how concussion happen?) f/u appt will need letter for work, symptoms has improve but still ongoing also.       Current Symptoms : Yes
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
## 2021-06-10 NOTE — PROGRESS NOTES
How have you been doing since we last saw you? any concerns?( new pt. Ask how concussion happen?) Pt feels discouraged from experiencing set backs like the long school days and kids are rambunctious causing her to not want to be at school, not back to 4 days yet still at 3, loud sounds still are bothering her, headaches rated around 3-4.      Current Symptoms : Yes

## 2021-06-10 NOTE — PROGRESS NOTES
How have you been doing since we last saw you? any concerns?( new pt. Ask how concussion happen?) pt stated that things have improved, however loud noises are still bordering her and felling tired.       Current Symptoms : Yes

## 2021-06-11 NOTE — PROGRESS NOTES
How have you been doing since we last saw you? any concerns?( new pt. Ask how concussion happen?) Pt starts back to work tomorrow working two days in a row.        Current Symptoms : Yes headaches
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
## 2021-07-09 ENCOUNTER — TRANSFERRED RECORDS (OUTPATIENT)
Dept: HEALTH INFORMATION MANAGEMENT | Facility: CLINIC | Age: 61
End: 2021-07-09

## 2021-07-29 DIAGNOSIS — I10 ESSENTIAL HYPERTENSION: ICD-10-CM

## 2021-07-30 DIAGNOSIS — M19.90 ARTHRITIS: ICD-10-CM

## 2021-08-02 RX ORDER — METOPROLOL SUCCINATE 25 MG/1
TABLET, EXTENDED RELEASE ORAL
Qty: 270 TABLET | Refills: 1 | Status: SHIPPED | OUTPATIENT
Start: 2021-08-02 | End: 2021-09-03

## 2021-08-02 NOTE — TELEPHONE ENCOUNTER
Prescription approved per [facility] Refill Protocol.  IRVIN CastanonN, RN  [facility]

## 2021-08-03 RX ORDER — NAPROXEN 500 MG/1
TABLET ORAL
Qty: 30 TABLET | Refills: 0 | Status: SHIPPED | OUTPATIENT
Start: 2021-08-03 | End: 2021-09-03

## 2021-09-03 ENCOUNTER — OFFICE VISIT (OUTPATIENT)
Dept: FAMILY MEDICINE | Facility: CLINIC | Age: 61
End: 2021-09-03
Payer: COMMERCIAL

## 2021-09-03 VITALS
HEART RATE: 54 BPM | BODY MASS INDEX: 34.06 KG/M2 | TEMPERATURE: 97.1 F | WEIGHT: 234 LBS | SYSTOLIC BLOOD PRESSURE: 113 MMHG | OXYGEN SATURATION: 97 % | DIASTOLIC BLOOD PRESSURE: 77 MMHG

## 2021-09-03 DIAGNOSIS — I10 ESSENTIAL HYPERTENSION: ICD-10-CM

## 2021-09-03 DIAGNOSIS — F33.1 MODERATE EPISODE OF RECURRENT MAJOR DEPRESSIVE DISORDER (H): ICD-10-CM

## 2021-09-03 DIAGNOSIS — Z23 NEED FOR SHINGLES VACCINE: ICD-10-CM

## 2021-09-03 DIAGNOSIS — F90.9 ATTENTION DEFICIT HYPERACTIVITY DISORDER (ADHD), UNSPECIFIED ADHD TYPE: ICD-10-CM

## 2021-09-03 DIAGNOSIS — Z12.31 ENCOUNTER FOR SCREENING MAMMOGRAM FOR BREAST CANCER: ICD-10-CM

## 2021-09-03 DIAGNOSIS — Z12.11 COLON CANCER SCREENING: ICD-10-CM

## 2021-09-03 PROCEDURE — 90471 IMMUNIZATION ADMIN: CPT | Performed by: FAMILY MEDICINE

## 2021-09-03 PROCEDURE — 99214 OFFICE O/P EST MOD 30 MIN: CPT | Mod: 25 | Performed by: FAMILY MEDICINE

## 2021-09-03 PROCEDURE — 90750 HZV VACC RECOMBINANT IM: CPT | Performed by: FAMILY MEDICINE

## 2021-09-03 PROCEDURE — 96127 BRIEF EMOTIONAL/BEHAV ASSMT: CPT | Performed by: FAMILY MEDICINE

## 2021-09-03 RX ORDER — METOPROLOL SUCCINATE 50 MG/1
50 TABLET, EXTENDED RELEASE ORAL DAILY
Qty: 90 TABLET | Refills: 0 | Status: SHIPPED | OUTPATIENT
Start: 2021-09-03 | End: 2022-03-03

## 2021-09-03 RX ORDER — BUPROPION HYDROCHLORIDE 300 MG/1
300 TABLET ORAL EVERY MORNING
Qty: 90 TABLET | Refills: 1 | Status: SHIPPED | OUTPATIENT
Start: 2021-09-03 | End: 2022-04-26

## 2021-09-03 ASSESSMENT — ANXIETY QUESTIONNAIRES
GAD7 TOTAL SCORE: 1
4. TROUBLE RELAXING: NOT AT ALL
3. WORRYING TOO MUCH ABOUT DIFFERENT THINGS: NOT AT ALL
1. FEELING NERVOUS, ANXIOUS, OR ON EDGE: NOT AT ALL
GAD7 TOTAL SCORE: 1
5. BEING SO RESTLESS THAT IT IS HARD TO SIT STILL: NOT AT ALL
GAD7 TOTAL SCORE: 1
2. NOT BEING ABLE TO STOP OR CONTROL WORRYING: NOT AT ALL
7. FEELING AFRAID AS IF SOMETHING AWFUL MIGHT HAPPEN: NOT AT ALL
8. IF YOU CHECKED OFF ANY PROBLEMS, HOW DIFFICULT HAVE THESE MADE IT FOR YOU TO DO YOUR WORK, TAKE CARE OF THINGS AT HOME, OR GET ALONG WITH OTHER PEOPLE?: NOT DIFFICULT AT ALL
7. FEELING AFRAID AS IF SOMETHING AWFUL MIGHT HAPPEN: NOT AT ALL
6. BECOMING EASILY ANNOYED OR IRRITABLE: SEVERAL DAYS

## 2021-09-03 ASSESSMENT — PATIENT HEALTH QUESTIONNAIRE - PHQ9
SUM OF ALL RESPONSES TO PHQ QUESTIONS 1-9: 4
10. IF YOU CHECKED OFF ANY PROBLEMS, HOW DIFFICULT HAVE THESE PROBLEMS MADE IT FOR YOU TO DO YOUR WORK, TAKE CARE OF THINGS AT HOME, OR GET ALONG WITH OTHER PEOPLE: SOMEWHAT DIFFICULT
SUM OF ALL RESPONSES TO PHQ QUESTIONS 1-9: 4

## 2021-09-03 NOTE — PATIENT INSTRUCTIONS
Blood pressure -   Decrease to metoprolol 50 mg daily   Continue to monitor home blood pressures   If your home blood pressures continue to be in 100-110/60-70, then we decrease dose further     Depression, ADHD -   Increase Wellbutrin XL from 150 mg daily to 300 mg daily   Continue current dose of Zoloft    Message me with an update in one month.

## 2021-09-03 NOTE — PROGRESS NOTES
Answers for HPI/ROS submitted by the patient on 9/3/2021  If you checked off any problems, how difficult have these problems made it for you to do your work, take care of things at home, or get along with other people?: Somewhat difficult  PHQ9 TOTAL SCORE: 4  RAFAELA 7 TOTAL SCORE: 1        Assessment & Plan       Essential hypertension  - Congratulated pt on weight loss. Due to low B/P will reduce Metoprolol dose from 75 mg to 50 mg daily. Continue to monitor and decrease dose if needed.   - metoprolol succinate ER (TOPROL-XL) 50 MG 24 hr tablet; Take 1 tablet (50 mg) by mouth daily  Dispense: 90 tablet; Refill: 0    Moderate episode of recurrent major depressive disorder, ADHD  - ADHD symptoms not controlled, will increase Wellbutrin dose from 150 to 300  Mg daily. Continue to monitor in one month and adjust medication if needed.   - buPROPion (WELLBUTRIN XL) 300 MG 24 hr tablet; Take 1 tablet (300 mg) by mouth every morning  Dispense: 90 tablet; Refill: 1  - sertraline (ZOLOFT) 50 MG tablet; Take 1.5 tablets (75 mg) by mouth daily  Dispense: 135 tablet; Refill: 0    Colon cancer screening  - COLOGUASHA([employer])    Encounter for screening mammogram for breast cancer  - MA Screening Digital Bilateral; Future    Need for shingles vaccine  - ZOSTER VACCINE RECOMBINANT ADJUVANTED IM NJX      Deqa Jose Juan Guillen MD  [facility]    Buddy Bowman is a 61 year old who presents for the following health issues     HPI     H/o uterine cancer s/p total abdominal hysterectomy and bilateral salpingo-oophorectomy     HTN - Lost 16 LBS, doing well with [employer] program.   ADHD - not controlled  Depression - she is having some irritability   No anxiety   No side effects from medication       Review of Systems         Objective    LMP  (LMP Unknown)   There is no height or weight on file to calculate BMI.  Physical Exam

## 2021-09-04 ENCOUNTER — HEALTH MAINTENANCE LETTER (OUTPATIENT)
Age: 61
End: 2021-09-04

## 2021-09-04 ASSESSMENT — ANXIETY QUESTIONNAIRES: GAD7 TOTAL SCORE: 1

## 2021-09-04 ASSESSMENT — PATIENT HEALTH QUESTIONNAIRE - PHQ9: SUM OF ALL RESPONSES TO PHQ QUESTIONS 1-9: 4

## 2021-12-05 LAB — COLOGUARD-ABSTRACT: NEGATIVE

## 2022-02-19 ENCOUNTER — HEALTH MAINTENANCE LETTER (OUTPATIENT)
Age: 62
End: 2022-02-19

## 2022-03-03 DIAGNOSIS — I10 ESSENTIAL HYPERTENSION: ICD-10-CM

## 2022-03-03 RX ORDER — METOPROLOL SUCCINATE 50 MG/1
TABLET, EXTENDED RELEASE ORAL
Qty: 90 TABLET | Refills: 0 | Status: SHIPPED | OUTPATIENT
Start: 2022-03-03 | End: 2022-04-26

## 2022-03-03 NOTE — TELEPHONE ENCOUNTER
Prescription approved per [facility] Refill Protocol.  Chyna Briseno RN  [facility]

## 2022-04-26 ENCOUNTER — OFFICE VISIT (OUTPATIENT)
Dept: FAMILY MEDICINE | Facility: CLINIC | Age: 62
End: 2022-04-26
Payer: COMMERCIAL

## 2022-04-26 VITALS
HEIGHT: 70 IN | HEART RATE: 52 BPM | OXYGEN SATURATION: 100 % | RESPIRATION RATE: 16 BRPM | BODY MASS INDEX: 34.5 KG/M2 | TEMPERATURE: 97.2 F | WEIGHT: 241 LBS | DIASTOLIC BLOOD PRESSURE: 88 MMHG | SYSTOLIC BLOOD PRESSURE: 134 MMHG

## 2022-04-26 DIAGNOSIS — Z23 HIGH PRIORITY FOR 2019-NCOV VACCINE: ICD-10-CM

## 2022-04-26 DIAGNOSIS — R11.2 NAUSEA AND VOMITING, INTRACTABILITY OF VOMITING NOT SPECIFIED, UNSPECIFIED VOMITING TYPE: ICD-10-CM

## 2022-04-26 DIAGNOSIS — Z00.00 HEALTHCARE MAINTENANCE: ICD-10-CM

## 2022-04-26 DIAGNOSIS — I10 ESSENTIAL HYPERTENSION: ICD-10-CM

## 2022-04-26 DIAGNOSIS — R51.9 NONINTRACTABLE EPISODIC HEADACHE, UNSPECIFIED HEADACHE TYPE: ICD-10-CM

## 2022-04-26 DIAGNOSIS — F33.1 MODERATE EPISODE OF RECURRENT MAJOR DEPRESSIVE DISORDER (H): ICD-10-CM

## 2022-04-26 DIAGNOSIS — R19.5 LOOSE STOOLS: ICD-10-CM

## 2022-04-26 LAB
ALBUMIN SERPL-MCNC: 3.8 G/DL (ref 3.4–5)
ALP SERPL-CCNC: 79 U/L (ref 40–150)
ALT SERPL W P-5'-P-CCNC: 16 U/L (ref 0–50)
ANION GAP SERPL CALCULATED.3IONS-SCNC: 7 MMOL/L (ref 3–14)
AST SERPL W P-5'-P-CCNC: 13 U/L (ref 0–45)
BILIRUB SERPL-MCNC: 0.8 MG/DL (ref 0.2–1.3)
BUN SERPL-MCNC: 12 MG/DL (ref 7–30)
CALCIUM SERPL-MCNC: 9.2 MG/DL (ref 8.5–10.1)
CHLORIDE BLD-SCNC: 109 MMOL/L (ref 94–109)
CO2 SERPL-SCNC: 24 MMOL/L (ref 20–32)
CREAT SERPL-MCNC: 0.93 MG/DL (ref 0.52–1.04)
ERYTHROCYTE [DISTWIDTH] IN BLOOD BY AUTOMATED COUNT: 15.2 % (ref 10–15)
ERYTHROCYTE [SEDIMENTATION RATE] IN BLOOD BY WESTERGREN METHOD: 10 MM/HR (ref 0–30)
GFR SERPL CREATININE-BSD FRML MDRD: 70 ML/MIN/1.73M2
GLUCOSE BLD-MCNC: 93 MG/DL (ref 70–99)
HCT VFR BLD AUTO: 33 % (ref 35–47)
HGB BLD-MCNC: 10.6 G/DL (ref 11.7–15.7)
MCH RBC QN AUTO: 25.7 PG (ref 26.5–33)
MCHC RBC AUTO-ENTMCNC: 32.1 G/DL (ref 31.5–36.5)
MCV RBC AUTO: 80 FL (ref 78–100)
PLATELET # BLD AUTO: 153 10E3/UL (ref 150–450)
POTASSIUM BLD-SCNC: 4.1 MMOL/L (ref 3.4–5.3)
PROT SERPL-MCNC: 7.4 G/DL (ref 6.8–8.8)
RBC # BLD AUTO: 4.13 10E6/UL (ref 3.8–5.2)
SODIUM SERPL-SCNC: 140 MMOL/L (ref 133–144)
TSH SERPL DL<=0.005 MIU/L-ACNC: 2.26 MU/L (ref 0.4–4)
WBC # BLD AUTO: 5 10E3/UL (ref 4–11)

## 2022-04-26 PROCEDURE — 86003 ALLG SPEC IGE CRUDE XTRC EA: CPT | Mod: 59 | Performed by: FAMILY MEDICINE

## 2022-04-26 PROCEDURE — 91306 COVID-19,PF,MODERNA (18+ YRS BOOSTER .25ML): CPT | Performed by: FAMILY MEDICINE

## 2022-04-26 PROCEDURE — 96127 BRIEF EMOTIONAL/BEHAV ASSMT: CPT | Performed by: FAMILY MEDICINE

## 2022-04-26 PROCEDURE — 0064A COVID-19,PF,MODERNA (18+ YRS BOOSTER .25ML): CPT | Performed by: FAMILY MEDICINE

## 2022-04-26 PROCEDURE — 85652 RBC SED RATE AUTOMATED: CPT | Performed by: FAMILY MEDICINE

## 2022-04-26 PROCEDURE — 84443 ASSAY THYROID STIM HORMONE: CPT | Performed by: FAMILY MEDICINE

## 2022-04-26 PROCEDURE — 99215 OFFICE O/P EST HI 40 MIN: CPT | Mod: 25 | Performed by: FAMILY MEDICINE

## 2022-04-26 PROCEDURE — 80053 COMPREHEN METABOLIC PANEL: CPT | Performed by: FAMILY MEDICINE

## 2022-04-26 PROCEDURE — 85027 COMPLETE CBC AUTOMATED: CPT | Performed by: FAMILY MEDICINE

## 2022-04-26 PROCEDURE — 36415 COLL VENOUS BLD VENIPUNCTURE: CPT | Performed by: FAMILY MEDICINE

## 2022-04-26 PROCEDURE — 86364 TISS TRNSGLTMNASE EA IG CLAS: CPT | Performed by: FAMILY MEDICINE

## 2022-04-26 RX ORDER — BUPROPION HYDROCHLORIDE 300 MG/1
300 TABLET ORAL EVERY MORNING
Qty: 90 TABLET | Refills: 3 | Status: SHIPPED | OUTPATIENT
Start: 2022-04-26 | End: 2024-04-26

## 2022-04-26 RX ORDER — DIAZEPAM 5 MG
TABLET ORAL
Qty: 2 TABLET | Refills: 0 | Status: SHIPPED | OUTPATIENT
Start: 2022-04-26 | End: 2022-12-02

## 2022-04-26 RX ORDER — METOPROLOL SUCCINATE 50 MG/1
50 TABLET, EXTENDED RELEASE ORAL DAILY
Qty: 90 TABLET | Refills: 3 | Status: SHIPPED | OUTPATIENT
Start: 2022-04-26 | End: 2023-09-06

## 2022-04-26 ASSESSMENT — PATIENT HEALTH QUESTIONNAIRE - PHQ9
SUM OF ALL RESPONSES TO PHQ QUESTIONS 1-9: 4
SUM OF ALL RESPONSES TO PHQ QUESTIONS 1-9: 4
10. IF YOU CHECKED OFF ANY PROBLEMS, HOW DIFFICULT HAVE THESE PROBLEMS MADE IT FOR YOU TO DO YOUR WORK, TAKE CARE OF THINGS AT HOME, OR GET ALONG WITH OTHER PEOPLE: SOMEWHAT DIFFICULT

## 2022-04-26 NOTE — PATIENT INSTRUCTIONS
Headache   You can try over the counter Excedrin migraine for headaches.  If headaches continue to persist over the next two weeks then please schedule MRI and neurologist appointment. Please call [phone] to schedule imaging.    Please let me know if insurance covers CDI for open MRI, I will then place the order.   I have sent in a script for Valium if needed before MRI. Please have someone drive you to imaging appointment.     Loose stools, abdominal pain   Avoiding fatty foods, spicy foods, dairy, citrus foods, caffeinated beverages, sparkling water and alcohol.   No eating three hours before going to bed.  If symptoms continue to persist over next 4 weeks, then you would benefit from evaluation with gastroenterologist. Please send me an update with your symptoms.

## 2022-04-26 NOTE — PROGRESS NOTES
Assessment & Plan     Moderate episode of recurrent major depressive disorder (H)  .  PHQ 6/8/2021 9/3/2021 4/26/2022   PHQ-9 Total Score 17 4 4   Q9: Thoughts of better off dead/self-harm past 2 weeks Not at all Not at all Not at all   - stable, refill below   - buPROPion (WELLBUTRIN XL) 300 MG 24 hr tablet; Take 1 tablet (300 mg) by mouth every morning  Dispense: 90 tablet; Refill: 3  - sertraline (ZOLOFT) 50 MG tablet; Take 1.5 tablets (75 mg) by mouth daily  Dispense: 135 tablet; Refill: 3    Essential hypertension  - stable, refill below  - metoprolol succinate ER (TOPROL-XL) 50 MG 24 hr tablet; Take 1 tablet (50 mg) by mouth daily  Dispense: 90 tablet; Refill: 3    Loose stools  Nausea and vomiting  Nonintractable episodic headache  Symptoms started on April 1, 2022 and have been weekly. She did have dairy during her birthday and has been since decreasing intake. She has symptoms which start with headache with nausea and abdominal discomfort. Then vomiting and the stools continue beyond the headache two day period. Unclear what could be contributing to symptoms. 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   Cologard negative 12/5/21  I recommended below recommendations -   Headache -   You can try over the counter Excedrin migraine for headaches.  If headaches continue to persist over the next two weeks then please schedule MRI and neurologist appointment. Please call [phone] to schedule imaging.    Please let me know if insurance covers CDI for open MRI, I will then place the order.   I have sent in a script for Valium if needed before MRI. Please have someone drive you to imaging appointment.   Loose stools, abdominal pain -   Avoiding fatty foods, spicy foods, dairy, citrus foods, caffeinated beverages, sparkling water and alcohol.   No eating three hours before going to bed.  If symptoms continue to persist over next 4 weeks, then you would benefit from evaluation with  gastroenterologist. Please send me an update with your symptoms.   - CBC with platelets; Future  - Comprehensive metabolic panel (BMP + Alb, Alk Phos, ALT, AST, Total. Bili, TP); Future  - ESR: Erythrocyte sedimentation rate; Future  - Tissue transglutaminase ranjan IgA and IgG; Future  - Allergy adult food panel; Future  - TSH with free T4 reflex; Future  - CBC with platelets  - Comprehensive metabolic panel (BMP + Alb, Alk Phos, ALT, AST, Total. Bili, TP)  - ESR: Erythrocyte sedimentation rate  - Tissue transglutaminase ranjan IgA and IgG  - Allergy adult food panel  - TSH with free T4 reflex  - MR Brain w/o & w Contrast; Future  - Adult Neurology  Referral; Future  - diazepam (VALIUM) 5 MG tablet; Valium 5 mg, 15 minutes prior to procedure; if needed due to incomplete response and/or duration of procedure, may repeat the dose after 5 to 30 minutes  Dispense: 2 tablet; Refill: 0    High priority for 2019-nCoV vaccine  - COVID-19,PF,MODERNA (18+ Yrs BOOSTER .25mL)    Healthcare maintenance   - Given mammogram information.     41 minutes spent on the date of the encounter doing chart review, history and exam, documentation and further activities per the note    Hannah Guillen MD  [facility]    Buddy Bowman is a 62 year old who presents for the following health issues     History of Present Illness       Mental Health Follow-up:  Patient presents to follow-up on Depression.Patient's depression since last visit has been:  Good  The patient is having other symptoms associated with depression.      Any significant life events: No  Patient is not feeling anxious or having panic attacks.  Patient has no concerns about alcohol or drug use.       Today's PHQ-9         PHQ-9 Total Score: 4  PHQ-9 Q9 Thoughts of better off dead/self-harm past 2 weeks :   (P) Not at all    How difficult have these problems made it for you to do your work, take care of things at home, or get along  "with other people: Somewhat difficult        Hypertension: She presents for follow up of hypertension.  She does check blood pressure  regularly outside of the clinic. Outpatient blood pressures have not been over 140/90. She follows a low salt diet.     Migraines:   Since the patient's last clinic visit, headaches are: worsened  The patient is getting headaches:  A couple times a week  She is not able to do normal daily activities when she has a migraine.  The patient is taking the following rescue/relief medications:  Tylenol   Patient states \"I get some relief\" from the rescue/relief medications.   The patient is taking the following medications to prevent migraines:  No medications to prevent migraines  In the past 4 weeks, the patient has gone to an Urgent Care or Emergency Room 0 times times due to headaches.    Reason for visit:  Med refills and intermittent headache, nausea and vomiting in the last few weeks  Symptom onset:  3-4 weeks ago  Symptoms include:  Headache, nausea, vomiting and loose stool  Symptom intensity:  Moderate  Symptom progression:  Staying the same  Had these symptoms before:  No  What makes it worse:  Eating rich food?  What makes it better:  Vomiting    She eats 4 or more servings of fruits and vegetables daily.She consumes 1 sweetened beverage(s) daily.She exercises with enough effort to increase her heart rate 20 to 29 minutes per day.  She exercises with enough effort to increase her heart rate 4 days per week. She is missing 3 dose(s) of medications per week.       In the last month she has had several bouts of nausea, vomiting with loose stools.   She is unsure if it associated with what she is eating.   Sometimes she is getting a headache with nausea and vomiting.    Headaches - started on April 1, 2022. Headaches are occurring once/week and last for 2 days.   Yesterday she had a headache with nausea and vomiting. Yesterday she had a severe headache, frontal, sharp and non " "radiating. She was able to sleep. When she got up, she was able to eat a little bit and had some tylenol. That combination helped take the edge off. She had mild nausea. No vomiting. Sometimes she will have vomiting with headaches (4 times/month). A/s with light and noise sensitivity.     She has been trouble getting to sleep and she has been sleeping later. When she is having bouts of nausea/vomiting, she is sleeping through the day. She is getting around 8 hours/sleep.     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 No increased stressors.     She was swimming 4-5 times/week, she has been swimming less due to above symptoms.     Loose stools - started April 1, 2022   Over the last month she has had loose stools. These symptoms are occurring most days than not. She is having 3 stools/day and previously she was having one stool/day. Stools are now semi-formed. Pattern is headache with nausea and abdominal discomfort. Then vomiting and then the stools continue beyond the headache two day period.     No hematochezia, melena, new vision/hearing changes, numbness/tingling of extremities, confusion or difficulty speaking. Appetite varies, when she has headache, she has no appetite.     She has had a little bit of heartburn for the first few weeks of April 2022.     No recent head injuries or falls.     No recent travel.    No sick contacts.     No history of COVID infection.     No history of migraines.     Made mac/cheese for birthday. Partner is vegan. She has been staying away from dairy the last few weeks and still having this problem.         Review of Systems         Objective    LMP  (LMP Unknown)   There is no height or weight on file to calculate BMI.  Physical Exam   /88 (BP Location: Right arm, Patient Position: Sitting, Cuff Size: Adult Large)   Pulse 52   Temp 97.2  F (36.2  C) (Temporal)   Resp 16   Ht 1.765 m (5' 9.5\")   Wt 109.3 kg (241 lb)   LMP  (LMP Unknown)   SpO2 100%   BMI 35.08 kg/m  "   GENERAL: healthy, alert and no distress  EYES: Eyes grossly normal to inspection, PERRL and conjunctivae and sclerae normal  HENT: nose and mouth without ulcers or lesions  RESP: lungs clear to auscultation - no rales, rhonchi or wheezes  CV: regular rate and rhythm, normal S1 S2  ABDOMEN: soft, nontender, no hepatosplenomegaly, no masses and bowel sounds normal  NEURO: CN II-XII intact, Normal strength and tone, mentation intact and speech normal

## 2022-04-27 LAB
ALMOND IGE QN: <0.1 KU(A)/L
CASHEW NUT IGE QN: <0.1 KU(A)/L
CODFISH IGE QN: <0.1 KU(A)/L
COW MILK IGE QN: 0.14 KU(A)/L
EGG WHITE IGE QN: <0.1 KU(A)/L
HAZELNUT IGE QN: 0.14 KU(A)/L
IGE SERPL-ACNC: 87 KU/L (ref 0–114)
PEANUT IGE QN: <0.1 KU(A)/L
SALMON IGE QN: <0.1 KU(A)/L
SCALLOP IGE QN: <0.1 KU(A)/L
SESAME SEED IGE QN: 0.11 KU(A)/L
SHRIMP IGE QN: <0.1 KU(A)/L
SOYBEAN IGE QN: <0.1 KU(A)/L
TTG IGA SER-ACNC: 0.3 U/ML
TTG IGG SER-ACNC: <0.6 U/ML
TUNA IGE QN: <0.1 KU(A)/L
WALNUT IGE QN: <0.1 KU(A)/L
WHEAT IGE QN: 0.15 KU(A)/L

## 2022-04-27 ASSESSMENT — PATIENT HEALTH QUESTIONNAIRE - PHQ9: SUM OF ALL RESPONSES TO PHQ QUESTIONS 1-9: 4

## 2022-05-10 ENCOUNTER — NURSE TRIAGE (OUTPATIENT)
Dept: NURSING | Facility: CLINIC | Age: 62
End: 2022-05-10
Payer: COMMERCIAL

## 2022-05-10 ENCOUNTER — OFFICE VISIT (OUTPATIENT)
Dept: URGENT CARE | Facility: URGENT CARE | Age: 62
End: 2022-05-10
Payer: COMMERCIAL

## 2022-05-10 VITALS
SYSTOLIC BLOOD PRESSURE: 133 MMHG | DIASTOLIC BLOOD PRESSURE: 82 MMHG | TEMPERATURE: 98.1 F | OXYGEN SATURATION: 99 % | HEART RATE: 58 BPM | BODY MASS INDEX: 35.08 KG/M2 | WEIGHT: 241 LBS

## 2022-05-10 DIAGNOSIS — R25.1 INVOLUNTARY QUIVER: Primary | ICD-10-CM

## 2022-05-10 DIAGNOSIS — M62.838 SPASM OF MUSCLE: ICD-10-CM

## 2022-05-10 PROBLEM — F43.23 ADJUSTMENT DISORDER WITH MIXED ANXIETY AND DEPRESSED MOOD: Status: ACTIVE | Noted: 2017-06-02

## 2022-05-10 PROBLEM — C55 MALIGNANT NEOPLASM OF UTERUS (H): Status: ACTIVE | Noted: 2017-07-20

## 2022-05-10 PROBLEM — G44.309 POST-CONCUSSION HEADACHE: Status: ACTIVE | Noted: 2017-06-02

## 2022-05-10 PROCEDURE — 99213 OFFICE O/P EST LOW 20 MIN: CPT | Performed by: NURSE PRACTITIONER

## 2022-05-10 PROCEDURE — 93000 ELECTROCARDIOGRAM COMPLETE: CPT | Performed by: NURSE PRACTITIONER

## 2022-05-10 NOTE — PROGRESS NOTES
Chief Complaint   Patient presents with     Urgent Care     Heart Problem     C/O chest wall fluttering for 1 week       SUBJECTIVE:  Gracie Parra is a 62 year old female who presents to the clinic today with left sided chest wall spasms for a week, increasing in frequency. It feels like a cell phone is vibrating over her left breast tissue and quivering. No pain, shortness of breath, dizziness, hemoptysis, cough, injury, overuse. No relevant cardiac history. Getting a MRI next month for headaches, mom had a glioma. Had normal CBC, CMP, TSH last week. No excess caffeine, alcohol, new supplements.    Past Medical History:   Diagnosis Date     ADHD      Arthritis 25+ year     Asthma      Cancer (H) 4 yrs ago    Uterine cancer     Depression      Depressive disorder Since childhood    In remission     Hypertension      Psoriasis      buPROPion (WELLBUTRIN XL) 300 MG 24 hr tablet, Take 1 tablet (300 mg) by mouth every morning  diazepam (VALIUM) 5 MG tablet, Valium 5 mg, 15 minutes prior to procedure; if needed due to incomplete response and/or duration of procedure, may repeat the dose after 5 to 30 minutes  metoprolol succinate ER (TOPROL-XL) 50 MG 24 hr tablet, Take 1 tablet (50 mg) by mouth daily  sertraline (ZOLOFT) 50 MG tablet, Take 1.5 tablets (75 mg) by mouth daily    No current facility-administered medications on file prior to visit.    Social History     Tobacco Use     Smoking status: Never Smoker     Smokeless tobacco: Never Used   Substance Use Topics     Alcohol use: Yes     Alcohol/week: 0.0 standard drinks     Comment: One drink 1-2 times a month     Allergies   Allergen Reactions     Amlodipine      Other reaction(s): Edema,generalized     Lisinopril Cough     Review of Systems   All systems negative except for those listed above in HPI.    EXAM:   /82   Pulse 58   Temp 98.1  F (36.7  C) (Tympanic)   Wt 109.3 kg (241 lb)   LMP  (LMP Unknown)   SpO2 99%   BMI 35.08 kg/m       Physical Exam  Vitals reviewed.   Constitutional:       Appearance: Normal appearance.   HENT:      Head: Normocephalic and atraumatic.   Cardiovascular:      Rate and Rhythm: Normal rate and regular rhythm.      Pulses: Normal pulses.   Pulmonary:      Effort: Pulmonary effort is normal. No respiratory distress.      Breath sounds: Normal breath sounds. No stridor. No wheezing.   Chest:      Chest wall: No tenderness.   Musculoskeletal:         General: No swelling or tenderness. Normal range of motion.   Skin:     General: Skin is warm and dry.      Findings: No rash.   Neurological:      General: No focal deficit present.      Mental Status: She is alert and oriented to person, place, and time.      Cranial Nerves: No cranial nerve deficit.      Sensory: No sensory deficit.      Motor: No weakness.      Coordination: Coordination normal.      Gait: Gait normal.      Deep Tendon Reflexes: Reflexes normal.   Psychiatric:         Mood and Affect: Mood normal.         Behavior: Behavior normal.       EKG done in clinic read by me as normal sinus rhythm.    ASSESSMENT:    ICD-10-CM    1. Involuntary quiver  R25.1 EKG 12-lead complete w/read - Clinics   2. Spasm of muscle  M62.838      PLAN:    Chest wall flutter likely musculoskeletal  EKG normal, unchanged since last  No cardiac red flags  Rest, heat, massage, stretch, low stress  Reevaluation with primary care provider if lingering  ER if severe worsening, any chest pain, shortness of breath, dizziness, bloody sputum    Follow up with primary care provider with any problems, questions or concerns or if symptoms worsen or fail to improve. Patient agreed to plan and verbalized understanding.    Suzie Brown, SAMANTHA-BC  [facility]

## 2022-05-10 NOTE — TELEPHONE ENCOUNTER
Nurse Triage SBAR    Is this a 2nd Level Triage? YES, LICENSED PRACTITIONER REVIEW IS REQUIRED    Situation: Heart Fluttering    Background: Last 5 days having a fluttering feeling on the left side of her chest. Heart fluttering  Sometime lasts 30-45 seconds and sometimes its 15-20 seconds.     Assessment: No chest pain/pressure. Fluttering happens at least once an hour but sometimes a few minutes between. Headaches. Decreased appetite. BP: 178/99 P: 58  Recheck - BP: 167/99 P: 57    Protocol Recommended Disposition: Paged/Called Provider    Recommendation: Paging on call Dr Ileana Russo via smart web at 5:01pm.  Per MD - Patient should be seen in urgent care.     Provider Recommendation Follow Up:   Reached patient/caregiver. Informed of provider's recommendations. Patient verbalized understanding and agrees with the plan.     COVID 19 Nurse Triage Plan/Patient Instructions    Please be aware that novel coronavirus (COVID-19) may be circulating in the community. If you develop symptoms such as fever, cough, or SOB or if you have concerns about the presence of another infection including coronavirus (COVID-19), please contact your health care provider or visit [email or web address].     Disposition/Instructions    In-Person Visit with provider recommended. Reference Visit Selection Guide.    Thank you for taking steps to prevent the spread of this virus.  o Limit your contact with others.  o Wear a simple mask to cover your cough.  o Wash your hands well and often.    Resources    [facility]: About COVID-19: [email or web address]    CDC: What to Do If You're Sick: www.cdc.gov/coronavirus/2019-ncov/about/steps-when-sick.html    CDC: Ending Home Isolation: www.cdc.gov/coronavirus/2019-ncov/hcp/disposition-in-home-patients.html     CDC: Caring for Someone: www.cdc.gov/coronavirus/2019-ncov/if-you-are-sick/care-for-someone.html     [facility]: Interim Guidance for Hospital Discharge to Home:  www.[email or web address].[email or web address]/diseases/coronavirus/hcp/hospdischarge.pdf    [facility] clinical trials (COVID-19 research studies): [email or web address]/[email or web address]     Below are the COVID-19 hotlines at the [place] Department of [facility] ([facility]). Interpreters are available.   o For health questions: Call 084-191-5710 or 1-649.351.7442 (7 a.m. to 7 p.m.)  o For questions about schools and childcare: Call 938-189-8244 or 1-728.355.2219 (7 a.m. to 7 p.m.)     Rowena Montoya RN Nursing Advisor 5/10/2022 5:07 PM     Reason for Disposition    [1] Skipped or extra beat(s) AND [2] occurs 4 or more times per minute    Additional Information    Negative: Passed out (i.e., lost consciousness, collapsed and was not responding)    Negative: Shock suspected (e.g., cold/pale/clammy skin, too weak to stand, low BP, rapid pulse)    Negative: Difficult to awaken or acting confused (e.g., disoriented, slurred speech)    Negative: Visible sweat on face or sweat dripping down face    Negative: Unable to walk, or can only walk with assistance (e.g., requires support)    Negative: [1] Received SHOCK from implantable cardiac defibrillator AND [2] persisting symptoms (i.e., palpitations, lightheadedness)    Negative: [1] Dizziness, lightheadedness, or weakness AND [2] heart beating very rapidly (e.g., > 140 / minute)    Negative: [1] Dizziness, lightheadedness, or weakness AND [2] heart beating very slowly  (e.g., < 50 / minute)    Negative: Sounds like a life-threatening emergency to the triager    Negative: Chest pain    Negative: Difficulty breathing    Negative: Dizziness, lightheadedness, or weakness    Negative: [1] Heart beating very rapidly (e.g., > 140 / minute) AND [2] present now  (Exception: during exercise)    Negative: Heart beating very slowly (e.g., < 50 / minute)  (Exception: athlete)    Negative: New or worsened shortness of breath with activity (dyspnea on exertion)    Negative: Patient sounds very sick or  weak to the triager    Negative: [1] Heart beating very rapidly (e.g., > 140 / minute) AND [2] not present now  (Exception: during exercise)    Negative: [1] Skipped or extra beat(s) AND [2] increases with exercise or exertion    Negative: Difficult to awaken or acting confused (e.g., disoriented, slurred speech)    Negative: Severe difficulty breathing (e.g., struggling for each breath, speaks in single words)    Negative: [1] Weakness of the face, arm or leg on one side of the body AND [2] new onset    Negative: [1] Numbness (i.e., loss of sensation) of the face, arm or leg on one side of the body AND [2] new onset    Negative: [1] Chest pain lasts > 5 minutes AND [2] history of heart disease  (i.e., heart attack, bypass surgery, angina, angioplasty, CHF)    Negative: [1] Chest pain AND [2] took nitrogylcerin AND [3] pain was not relieved    Negative: Sounds like a life-threatening emergency to the triager    Negative: Symptom is main concern  (e.g., headache, chest pain)    Negative: Low blood pressure is main concern    Negative: [1] Pregnant > 20 weeks (or postpartum < 6 weeks) AND [2] new hand or face swelling    Negative: [1] Pregnant > 20 weeks AND [2] BP Systolic BP  >= 140 OR Diastolic >= 90    Negative: [1] Systolic BP  >= 160 OR Diastolic >= 100 AND [2] cardiac or neurologic symptoms (e.g., chest pain, difficulty breathing, unsteady gait, blurred vision)    Protocols used: HEART RATE AND HEARTBEAT [id number], HIGH BLOOD PRESSURE-A-AH

## 2022-05-28 ENCOUNTER — ANCILLARY PROCEDURE (OUTPATIENT)
Dept: MRI IMAGING | Facility: CLINIC | Age: 62
End: 2022-05-28
Attending: FAMILY MEDICINE
Payer: COMMERCIAL

## 2022-05-28 DIAGNOSIS — R51.9 NONINTRACTABLE EPISODIC HEADACHE, UNSPECIFIED HEADACHE TYPE: ICD-10-CM

## 2022-05-28 PROCEDURE — 70553 MRI BRAIN STEM W/O & W/DYE: CPT | Mod: GC | Performed by: STUDENT IN AN ORGANIZED HEALTH CARE EDUCATION/TRAINING PROGRAM

## 2022-05-28 PROCEDURE — A9585 GADOBUTROL INJECTION: HCPCS | Performed by: STUDENT IN AN ORGANIZED HEALTH CARE EDUCATION/TRAINING PROGRAM

## 2022-05-28 RX ORDER — GADOBUTROL 604.72 MG/ML
10 INJECTION INTRAVENOUS ONCE
Status: COMPLETED | OUTPATIENT
Start: 2022-05-28 | End: 2022-05-28

## 2022-05-28 RX ADMIN — GADOBUTROL 10 ML: 604.72 INJECTION INTRAVENOUS at 13:28

## 2022-05-28 NOTE — DISCHARGE INSTRUCTIONS

## 2022-07-19 ENCOUNTER — TRANSFERRED RECORDS (OUTPATIENT)
Dept: HEALTH INFORMATION MANAGEMENT | Facility: CLINIC | Age: 62
End: 2022-07-19

## 2022-08-17 ENCOUNTER — TRANSFERRED RECORDS (OUTPATIENT)
Dept: HEALTH INFORMATION MANAGEMENT | Facility: CLINIC | Age: 62
End: 2022-08-17

## 2022-09-14 ENCOUNTER — TRANSFERRED RECORDS (OUTPATIENT)
Dept: HEALTH INFORMATION MANAGEMENT | Facility: CLINIC | Age: 62
End: 2022-09-14

## 2022-10-16 ENCOUNTER — HEALTH MAINTENANCE LETTER (OUTPATIENT)
Age: 62
End: 2022-10-16

## 2022-12-02 ENCOUNTER — OFFICE VISIT (OUTPATIENT)
Dept: URGENT CARE | Facility: URGENT CARE | Age: 62
End: 2022-12-02
Payer: COMMERCIAL

## 2022-12-02 VITALS
TEMPERATURE: 98.4 F | BODY MASS INDEX: 34.93 KG/M2 | RESPIRATION RATE: 16 BRPM | OXYGEN SATURATION: 96 % | DIASTOLIC BLOOD PRESSURE: 82 MMHG | WEIGHT: 240 LBS | HEART RATE: 101 BPM | SYSTOLIC BLOOD PRESSURE: 135 MMHG

## 2022-12-02 DIAGNOSIS — R06.2 WHEEZING: ICD-10-CM

## 2022-12-02 DIAGNOSIS — J02.9 SORE THROAT: ICD-10-CM

## 2022-12-02 DIAGNOSIS — J06.9 VIRAL URI WITH COUGH: Primary | ICD-10-CM

## 2022-12-02 LAB
DEPRECATED S PYO AG THROAT QL EIA: NEGATIVE
FLUAV AG SPEC QL IA: NEGATIVE
FLUBV AG SPEC QL IA: NEGATIVE
GROUP A STREP BY PCR: NOT DETECTED

## 2022-12-02 PROCEDURE — U0003 INFECTIOUS AGENT DETECTION BY NUCLEIC ACID (DNA OR RNA); SEVERE ACUTE RESPIRATORY SYNDROME CORONAVIRUS 2 (SARS-COV-2) (CORONAVIRUS DISEASE [COVID-19]), AMPLIFIED PROBE TECHNIQUE, MAKING USE OF HIGH THROUGHPUT TECHNOLOGIES AS DESCRIBED BY CMS-2020-01-R: HCPCS | Performed by: NURSE PRACTITIONER

## 2022-12-02 PROCEDURE — 87651 STREP A DNA AMP PROBE: CPT | Performed by: NURSE PRACTITIONER

## 2022-12-02 PROCEDURE — 99214 OFFICE O/P EST MOD 30 MIN: CPT | Mod: CS | Performed by: NURSE PRACTITIONER

## 2022-12-02 PROCEDURE — 87804 INFLUENZA ASSAY W/OPTIC: CPT | Mod: 59 | Performed by: NURSE PRACTITIONER

## 2022-12-02 PROCEDURE — U0005 INFEC AGEN DETEC AMPLI PROBE: HCPCS | Performed by: NURSE PRACTITIONER

## 2022-12-02 RX ORDER — ALBUTEROL SULFATE 90 UG/1
2 AEROSOL, METERED RESPIRATORY (INHALATION) EVERY 4 HOURS PRN
Qty: 18 G | Refills: 0 | Status: SHIPPED | OUTPATIENT
Start: 2022-12-02 | End: 2023-01-01

## 2022-12-02 NOTE — PATIENT INSTRUCTIONS
Delsym cough syrup as needed.    May try Coricidin for congestion    OR    Pseudoephedrine from behind the pharmacy counter, but monitor you BP if you take this.    Rest and lots of fluids.    Discharge Instructions  Upper Respiratory Infection    The upper respiratory tract includes the sinuses, nasal passages, pharynx, and larynx. A URI, or upper respiratory infection, is an infection of any of the parts of the upper airway. Symptoms include runny nose, congestion, sore throat, cough, and fever. URIs are almost always caused by a virus. Antibiotics do not help with virus infections, so are not used for an ordinary URI. A URI is very contagious through coughing and nasal secretions; make sure you wash your hands often and clean surfaces after sneezing, coughing or touching them.  Viruses can live on surfaces for up to 3 days.      Return to the Emergency Department if:  Any of the symptoms you have get much worse.  You seem very sick, like being too weak to get up.  You have any new symptoms, especially serious things like chest pain.   You are short of breath.   You have a severe headache.  You are vomiting so much you can t keep fluids or medicines down.  You have confusion or seem unusually drowsy.  You have a seizure or convulsion.    Follow-up:    You should start to improve in 3 - 5 days.  A cough can linger for up to six weeks, but overall you should be feeling much better.  See your doctor if you have a fever for more than 3 days, or if you are not feeling better within 5 days.      What can I do to help myself?  Fill any prescriptions the doctor gave you and take them right away  If you have a fever, get plenty of rest and drink lots of fluids, especially water. Using a humidifier or saline nose spray will also help loosen secretions.   What clothes or blankets you have on won t change your fever. Do what is comfortable for you.  Bathing or sponging in lukewarm water may help you feel better.  Tylenol   (acetaminophen), Motrin  (ibuprofen), or Advil  (ibuprofen) help bring fever down and may help you feel more comfortable. Be sure to read and follow the package directions, and ask your doctor if you have questions.  Do not drink alcohol.  Decongestants may help you feel better. You may use decongestant nose sprays Afrin  (oxymetazoline) or Ashvin-Synephrine  (phenylephrine hydrochloride) for up to 3 days, or may use a decongestant tablet like Sudafed  (pseudoephedrine).  If you were given a prescription for medicine here today, be sure to read all of the information (including the package insert) that comes with your prescription.  This will include important information about the medicine, its side effects, and any warnings that you need to know about.  The pharmacist who fills the prescription can provide more information and answer questions you may have about the medicine.  If you have questions or concerns that the pharmacist cannot address, please call or return to the Emergency Department.   Opioid Medication Information    Pain medications are among the most commonly prescribed medicines, so we are including this information for all our patients. If you did not receive pain medication or get a prescription for pain medicine, you can ignore it.     You may have been given a prescription for an opioid (narcotic) pain medicine and/or have received a pain medicine while here in the Emergency Department. These medicines can make you drowsy or impaired. You must not drive, operate dangerous equipment, or engage in any other dangerous activities while taking these medications. If you drive while taking these medications, you could be arrested for DUI, or driving under the influence. Do not drink any alcohol while you are taking these medications.     Opioid pain medications can cause addiction. If you have a history of chemical dependency of any type, you are at a higher risk of becoming addicted to pain medications.   Only take these prescribed medications to treat your pain when all other options have been tried. Take it for as short a time and as few doses as possible. Store your pain pills in a secure place, as they are frequently stolen and provide a dangerous opportunity for children or visitors in your house to start abusing these powerful medications. We will not replace any lost or stolen medicine.  As soon as your pain is better, you should flush all your remaining medication.     Many prescription pain medications contain Tylenol  (acetaminophen), including Vicodin , Tylenol #3 , Norco , Lortab , and Percocet .  You should not take any extra pills of Tylenol  if you are using these prescription medications or you can get very sick.  Do not ever take more than 3000 mg of acetaminophen in any 24 hour period.    All opioids tend to cause constipation. Drink plenty of water and eat foods that have a lot of fiber, such as fruits, vegetables, prune juice, apple juice and high fiber cereal.  Take a laxative if you don t move your bowels at least every other day. Miralax , Milk of Magnesia, Colace , or Senna  can be used to keep you regular.      Remember that you can always come back to the Emergency Department if you are not able to see your regular doctor in the amount of time listed above, if you get any new symptoms, or if there is anything that worries you.

## 2022-12-02 NOTE — PROGRESS NOTES
Chief Complaint   Patient presents with     Urgent Care     Sinus Problem     Cough     Cough with mucus. Symptoms x2 days.      Pharyngitis     Headache     Otalgia     Dental Pain         ICD-10-CM    1. Viral URI with cough  J06.9 Symptomatic; Yes; 11/30/2022 COVID-19 Virus (Coronavirus) by PCR Nose     Influenza A & B Antigen - Clinic Collect     albuterol (PROAIR HFA/PROVENTIL HFA/VENTOLIN HFA) 108 (90 Base) MCG/ACT inhaler      2. Sore throat  J02.9 Streptococcus A Rapid Screen w/Reflex to PCR - Clinic Collect     Group A Streptococcus PCR Throat Swab     Symptomatic; Yes; 11/30/2022 COVID-19 Virus (Coronavirus) by PCR Nose     Influenza A & B Antigen - Clinic Collect      3. Wheezing  R06.2 albuterol (PROAIR HFA/PROVENTIL HFA/VENTOLIN HFA) 108 (90 Base) MCG/ACT inhaler      Delsym for cough, as well as inhaler that was prescribed.  If she develops increasing shortness of breath she is instructed to go to the emergency room for further treatment.  Test for influenza and COVID are pending.  She is outside of the window where Tamiflu would be useful.  She may use the decongestant Coricidin and if she chooses to use pseudoephedrine she is cautioned to monitor her blood pressure closely.  Rest and fluids.            Results for orders placed or performed in visit on 12/02/22 (from the past 24 hour(s))   Streptococcus A Rapid Screen w/Reflex to PCR - Clinic Collect    Specimen: Throat; Swab   Result Value Ref Range    Group A Strep antigen Negative Negative   Influenza A & B Antigen - Clinic Collect    Specimen: Nose; Swab   Result Value Ref Range    Influenza A antigen Negative Negative    Influenza B antigen Negative Negative    Narrative    Test results must be correlated with clinical data. If necessary, results should be confirmed by a molecular assay or viral culture.       Subjective     Gracie Parra is an 62 year old female who presents to clinic today for cough congestion, headache. Pain in  chest with coughing for 2 days. She has tried Dayquil and Nyquil.       ROS: 10 point ROS neg other than the symptoms noted above in the HPI.       Objective    /82   Pulse 101   Temp 98.4  F (36.9  C) (Oral)   Resp 16   Wt 108.9 kg (240 lb)   LMP  (LMP Unknown)   SpO2 96%   BMI 34.93 kg/m    Nurses notes and VS have been reviewed.    Physical Exam       GENERAL APPEARANCE: alert and moderate distress     EYES: PERRL, EOMI, sclera non-icteric     HENT: ear canals and TM's normal, rhinorrhea clear and tonsillar erythema     NECK: no adenopathy or asymmetry, thyroid normal to palpation     RESP: expiratory wheezes in upper lobes     CV: regular rates and rhythm, no murmurs, rubs, or gallop     MS: extremities normal- no gross deformities noted; normal muscle tone.     SKIN: no suspicious lesions or rashes     PSYCH: normal thought process; no significant mood disturbance    Patient Instructions   Delsym cough syrup as needed.    May try Coricidin for congestion    OR    Pseudoephedrine from behind the pharmacy counter, but monitor you BP if you take this.    Rest and lots of fluids.    Discharge Instructions  Upper Respiratory Infection    The upper respiratory tract includes the sinuses, nasal passages, pharynx, and larynx. A URI, or upper respiratory infection, is an infection of any of the parts of the upper airway. Symptoms include runny nose, congestion, sore throat, cough, and fever. URIs are almost always caused by a virus. Antibiotics do not help with virus infections, so are not used for an ordinary URI. A URI is very contagious through coughing and nasal secretions; make sure you wash your hands often and clean surfaces after sneezing, coughing or touching them.  Viruses can live on surfaces for up to 3 days.      Return to the Emergency Department if:    Any of the symptoms you have get much worse.    You seem very sick, like being too weak to get up.    You have any new symptoms, especially  serious things like chest pain.     You are short of breath.     You have a severe headache.    You are vomiting so much you can t keep fluids or medicines down.    You have confusion or seem unusually drowsy.    You have a seizure or convulsion.    Follow-up:      You should start to improve in 3 - 5 days.  A cough can linger for up to six weeks, but overall you should be feeling much better.  See your doctor if you have a fever for more than 3 days, or if you are not feeling better within 5 days.      What can I do to help myself?    Fill any prescriptions the doctor gave you and take them right away    If you have a fever, get plenty of rest and drink lots of fluids, especially water. Using a humidifier or saline nose spray will also help loosen secretions.     What clothes or blankets you have on won t change your fever. Do what is comfortable for you.    Bathing or sponging in lukewarm water may help you feel better.    Tylenol  (acetaminophen), Motrin  (ibuprofen), or Advil  (ibuprofen) help bring fever down and may help you feel more comfortable. Be sure to read and follow the package directions, and ask your doctor if you have questions.    Do not drink alcohol.    Decongestants may help you feel better. You may use decongestant nose sprays Afrin  (oxymetazoline) or Ashvin-Synephrine  (phenylephrine hydrochloride) for up to 3 days, or may use a decongestant tablet like Sudafed  (pseudoephedrine).  If you were given a prescription for medicine here today, be sure to read all of the information (including the package insert) that comes with your prescription.  This will include important information about the medicine, its side effects, and any warnings that you need to know about.  The pharmacist who fills the prescription can provide more information and answer questions you may have about the medicine.  If you have questions or concerns that the pharmacist cannot address, please call or return to the Emergency  Department.   Opioid Medication Information    Pain medications are among the most commonly prescribed medicines, so we are including this information for all our patients. If you did not receive pain medication or get a prescription for pain medicine, you can ignore it.     You may have been given a prescription for an opioid (narcotic) pain medicine and/or have received a pain medicine while here in the Emergency Department. These medicines can make you drowsy or impaired. You must not drive, operate dangerous equipment, or engage in any other dangerous activities while taking these medications. If you drive while taking these medications, you could be arrested for DUI, or driving under the influence. Do not drink any alcohol while you are taking these medications.     Opioid pain medications can cause addiction. If you have a history of chemical dependency of any type, you are at a higher risk of becoming addicted to pain medications.  Only take these prescribed medications to treat your pain when all other options have been tried. Take it for as short a time and as few doses as possible. Store your pain pills in a secure place, as they are frequently stolen and provide a dangerous opportunity for children or visitors in your house to start abusing these powerful medications. We will not replace any lost or stolen medicine.  As soon as your pain is better, you should flush all your remaining medication.     Many prescription pain medications contain Tylenol  (acetaminophen), including Vicodin , Tylenol #3 , Norco , Lortab , and Percocet .  You should not take any extra pills of Tylenol  if you are using these prescription medications or you can get very sick.  Do not ever take more than 3000 mg of acetaminophen in any 24 hour period.    All opioids tend to cause constipation. Drink plenty of water and eat foods that have a lot of fiber, such as fruits, vegetables, prune juice, apple juice and high fiber cereal.   Take a laxative if you don t move your bowels at least every other day. Miralax , Milk of Magnesia, Colace , or Senna  can be used to keep you regular.      Remember that you can always come back to the Emergency Department if you are not able to see your regular doctor in the amount of time listed above, if you get any new symptoms, or if there is anything that worries you.         SPIKE Pope, [facility] Urgent Care Provider    The use of Dragon/[employer] dictation services may have been used to construct the content in this note; any grammatical or spelling errors are non-intentional. Please contact the author of this note directly if you are in need of any clarification.

## 2022-12-03 LAB — SARS-COV-2 RNA RESP QL NAA+PROBE: NEGATIVE

## 2022-12-22 ENCOUNTER — ANCILLARY PROCEDURE (OUTPATIENT)
Dept: GENERAL RADIOLOGY | Facility: CLINIC | Age: 62
End: 2022-12-22
Attending: FAMILY MEDICINE
Payer: COMMERCIAL

## 2022-12-22 ENCOUNTER — OFFICE VISIT (OUTPATIENT)
Dept: URGENT CARE | Facility: URGENT CARE | Age: 62
End: 2022-12-22
Payer: COMMERCIAL

## 2022-12-22 VITALS
TEMPERATURE: 97.1 F | SYSTOLIC BLOOD PRESSURE: 130 MMHG | HEART RATE: 97 BPM | OXYGEN SATURATION: 98 % | DIASTOLIC BLOOD PRESSURE: 86 MMHG | RESPIRATION RATE: 18 BRPM

## 2022-12-22 DIAGNOSIS — J20.9 ACUTE BRONCHITIS WITH SYMPTOMS GREATER THAN 10 DAYS: ICD-10-CM

## 2022-12-22 DIAGNOSIS — R05.1 ACUTE COUGH: Primary | ICD-10-CM

## 2022-12-22 DIAGNOSIS — R05.1 ACUTE COUGH: ICD-10-CM

## 2022-12-22 PROCEDURE — 71046 X-RAY EXAM CHEST 2 VIEWS: CPT | Mod: TC | Performed by: RADIOLOGY

## 2022-12-22 PROCEDURE — U0003 INFECTIOUS AGENT DETECTION BY NUCLEIC ACID (DNA OR RNA); SEVERE ACUTE RESPIRATORY SYNDROME CORONAVIRUS 2 (SARS-COV-2) (CORONAVIRUS DISEASE [COVID-19]), AMPLIFIED PROBE TECHNIQUE, MAKING USE OF HIGH THROUGHPUT TECHNOLOGIES AS DESCRIBED BY CMS-2020-01-R: HCPCS | Performed by: FAMILY MEDICINE

## 2022-12-22 PROCEDURE — U0005 INFEC AGEN DETEC AMPLI PROBE: HCPCS | Performed by: FAMILY MEDICINE

## 2022-12-22 PROCEDURE — 99213 OFFICE O/P EST LOW 20 MIN: CPT | Performed by: FAMILY MEDICINE

## 2022-12-22 RX ORDER — AZITHROMYCIN 250 MG/1
TABLET, FILM COATED ORAL
Qty: 6 TABLET | Refills: 0 | Status: SHIPPED | OUTPATIENT
Start: 2022-12-22 | End: 2022-12-27

## 2022-12-22 RX ORDER — PREDNISONE 20 MG/1
40 TABLET ORAL DAILY
Qty: 10 TABLET | Refills: 0 | Status: SHIPPED | OUTPATIENT
Start: 2022-12-22 | End: 2022-12-27

## 2022-12-22 NOTE — PROGRESS NOTES
Assessment & Plan     Acute cough  CXR neg on my reading  - XR Chest 2 Views  - Symptomatic COVID-19 Virus (Coronavirus) by PCR Nose    Acute bronchitis with symptoms greater than 10 days  Will treat zpak and prednisone  - azithromycin (ZITHROMAX) 250 MG tablet  Dispense: 6 tablet; Refill: 0  - predniSONE (DELTASONE) 20 MG tablet  Dispense: 10 tablet; Refill: 0             No follow-ups on file.    Ulises White MD  [facility] [place]    Buddy Bowman is a 62 year old female who presents to clinic today for the following health issues:  Chief Complaint   Patient presents with     Urgent Care     URI     Cough and congestion over 1 month. Was seen on 12/2 and tests were negative. Cough persisting with fits and terrible fatigue.      HPI    Here with cough and fatigue.  Sleepy.  Productive cough and some wheezing.  No fevers.  Has been taking OTC cough medicine.  Was taking some codeine medicine.  Has history of asthma. Using inhaler albuterol.          Review of Systems        Objective    /86   Pulse 97   Temp 97.1  F (36.2  C) (Temporal)   Resp 18   LMP  (LMP Unknown)   SpO2 98%   Physical Exam

## 2022-12-22 NOTE — PATIENT INSTRUCTIONS
Xray looks normal.    I am going to treat you for bronchitis with steroids and antibioitcs    Assuming the COVID is negative I think you are no longer contagious and I would be fine with your wearing a mask or not.

## 2022-12-23 LAB — SARS-COV-2 RNA RESP QL NAA+PROBE: NEGATIVE

## 2022-12-30 ENCOUNTER — OFFICE VISIT (OUTPATIENT)
Dept: FAMILY MEDICINE | Facility: CLINIC | Age: 62
End: 2022-12-30
Payer: COMMERCIAL

## 2022-12-30 ENCOUNTER — ANCILLARY PROCEDURE (OUTPATIENT)
Dept: GENERAL RADIOLOGY | Facility: CLINIC | Age: 62
End: 2022-12-30
Attending: NURSE PRACTITIONER
Payer: COMMERCIAL

## 2022-12-30 ENCOUNTER — NURSE TRIAGE (OUTPATIENT)
Dept: NURSING | Facility: CLINIC | Age: 62
End: 2022-12-30

## 2022-12-30 VITALS
TEMPERATURE: 96.7 F | OXYGEN SATURATION: 95 % | RESPIRATION RATE: 18 BRPM | SYSTOLIC BLOOD PRESSURE: 124 MMHG | DIASTOLIC BLOOD PRESSURE: 82 MMHG | HEART RATE: 117 BPM

## 2022-12-30 DIAGNOSIS — J22 LOWER RESPIRATORY INFECTION (E.G., BRONCHITIS, PNEUMONIA, PNEUMONITIS, PULMONITIS): ICD-10-CM

## 2022-12-30 DIAGNOSIS — J22 LOWER RESPIRATORY INFECTION (E.G., BRONCHITIS, PNEUMONIA, PNEUMONITIS, PULMONITIS): Primary | ICD-10-CM

## 2022-12-30 PROCEDURE — 71046 X-RAY EXAM CHEST 2 VIEWS: CPT | Mod: TC | Performed by: RADIOLOGY

## 2022-12-30 PROCEDURE — 99214 OFFICE O/P EST MOD 30 MIN: CPT

## 2022-12-30 RX ORDER — DOXYCYCLINE 100 MG/1
100 CAPSULE ORAL 2 TIMES DAILY
Qty: 14 CAPSULE | Refills: 0 | Status: SHIPPED | OUTPATIENT
Start: 2022-12-30 | End: 2023-01-06

## 2022-12-30 NOTE — TELEPHONE ENCOUNTER
Patient went to Urgent care on the 22nd dx bronchitis. SHe reported that [facility] tested her for Covid and influena and both were negative.     Patient feeling worsening symptoms after taking her course of antibiotics and steroids. Increased feeling of fatigue, coughing, congestion, and fever yesterday of 100.0.     Care advice given    Disposition: Go to ED. Patient plans to have her S/O take her to the ED.     Chyna Marquis RN on 12/30/2022 at 10:32 AM      Reason for Disposition    Difficulty breathing    Additional Information    Negative: SEVERE difficulty breathing (e.g., struggling for each breath, speaks in single words)    Negative: Shock suspected (e.g., cold/pale/clammy skin, too weak to stand, low BP, rapid pulse)    Negative: Difficult to awaken or acting confused (e.g., disoriented, slurred speech)    Negative: Fainted > 15 minutes ago and still feels too weak or dizzy to stand    Negative: SEVERE weakness (i.e., unable to walk or barely able to walk, requires support) and new-onset or worsening    Negative: Sounds like a life-threatening emergency to the triager    Protocols used: WEAKNESS (GENERALIZED) AND FATIGUE-A-OH

## 2022-12-30 NOTE — PROGRESS NOTES
Assessment & Plan     Lower respiratory infection (e.g., bronchitis, pneumonia, pneumonitis, pulmonitis)  Chest x-ray clear per radiology read.  We will treat for bronchitis with a different antibiotic today.  Recommend if she is not feeling better by day 5 she see her primary care provider. Go to the emergency room for any chest pain or shortness of breath.    - XR Chest 2 Views; Future  - doxycycline hyclate (VIBRAMYCIN) 100 MG capsule; Take 1 capsule (100 mg) by mouth 2 times daily for 7 days      20 minutes spent on the date of the encounter doing chart review, review of test results, patient visit and documentation       See Patient Instructions    Return in about 5 days (around 1/4/2023), or if symptoms worsen or fail to improve.    [facility]    Buddy Bowman is a 62 year old, presenting for the following health issues:  Urgent Care and Cough (Pt was seen recently and was given abx and steroids. Pt completed the course but not she still feeling fatigue and coughing a lot)      HPI     Presents with ongoing cough and congestion for the past month.  Was seen earlier in the month for viral upper respiratory infection with negative flu, COVID and strep test.  Was seen a week later and treated for bronchitis with a 5-day course of prednisone and a Z-Mannie.  States she was feeling better on these medications but once she stopped the Z-Mannie and prednisone all of her symptoms came back.  No fever, does feel slightly short of breath with activity, pain in the chest when she takes a deep breath.  Cough is loose and nonproductive.  Does experience nasal congestion and postnasal drainage.  Has been using NyQuil at bedtime.    Review of Systems   Constitutional, HEENT, cardiovascular, pulmonary, gi and gu systems are negative, except as otherwise noted.      Objective    /82   Pulse 117   Temp (!) 96.7  F (35.9  C)  (Tympanic)   Resp 18   LMP  (LMP Unknown)   SpO2 95%   There is no height or weight on file to calculate BMI.  Physical Exam   GENERAL: healthy, alert and no distress  EYES: Eyes grossly normal to inspection, PERRL and conjunctivae and sclerae normal  HENT: ear canals and TM's normal, nose and mouth without ulcers or lesions  NECK: no adenopathy  RESP: lungs clear to auscultation - no rales, rhonchi or wheezes  CV: regular rates and rhythm, normal S1 S2, no S3 or S4 and no murmur, click or rub    Results for orders placed or performed in visit on 12/30/22   XR Chest 2 Views     Status: None    Narrative    XR CHEST 2 VIEWS   12/30/2022 12:49 PM     HISTORY: CXR done couple week ago that was negative. Sx worsening, r/o  pneumonia; Lower respiratory infection (e.g., bronchitis, pneumonia,  pneumonitis, pulmonitis)    COMPARISON: 12/22/2022.      Impression    IMPRESSION: Negative chest.    MER BUITRAGO MD         SYSTEM ID:  [id number]

## 2023-01-30 ENCOUNTER — TELEPHONE (OUTPATIENT)
Dept: FAMILY MEDICINE | Facility: CLINIC | Age: 63
End: 2023-01-30
Payer: COMMERCIAL

## 2023-01-30 ENCOUNTER — TELEPHONE (OUTPATIENT)
Dept: FAMILY MEDICINE | Facility: CLINIC | Age: 63
End: 2023-01-30

## 2023-01-30 NOTE — TELEPHONE ENCOUNTER
Dr. Chapin --    FYI -- scheduled with you tomorrow, Tuesday.     S-(situation): cough, congestion, tiredness for past two months. No fever.     B-(background): Patient started getting sick around Thanksgiving and has not been healthy since that time. Has been on 2 different antibiotics and steroids. Has been to urgent care twice. Has had x-rays taken. History of asthma.   Patient has been treating symptoms with OTC medications. Cough is terrible at night and keeps her up.     A-(assessment): Patient needs to be assessed sooner. Had appointment scheduled for February 13 2023.     R-(recommendations): To be assessed with further testing. Writer was able to schedule patient with an opening for tomorrow.     IRVIN EnglishN ZULEMA  [facility]

## 2023-01-31 ENCOUNTER — OFFICE VISIT (OUTPATIENT)
Dept: FAMILY MEDICINE | Facility: CLINIC | Age: 63
End: 2023-01-31
Payer: COMMERCIAL

## 2023-01-31 ENCOUNTER — ANCILLARY PROCEDURE (OUTPATIENT)
Dept: GENERAL RADIOLOGY | Facility: CLINIC | Age: 63
End: 2023-01-31
Attending: FAMILY MEDICINE
Payer: COMMERCIAL

## 2023-01-31 VITALS
BODY MASS INDEX: 35.99 KG/M2 | SYSTOLIC BLOOD PRESSURE: 133 MMHG | WEIGHT: 243 LBS | DIASTOLIC BLOOD PRESSURE: 79 MMHG | HEART RATE: 63 BPM | RESPIRATION RATE: 16 BRPM | OXYGEN SATURATION: 98 % | HEIGHT: 69 IN | TEMPERATURE: 97.4 F

## 2023-01-31 DIAGNOSIS — J45.30 MILD PERSISTENT ALLERGIC ASTHMA WITHOUT COMPLICATION: ICD-10-CM

## 2023-01-31 DIAGNOSIS — R05.2 SUBACUTE COUGH: ICD-10-CM

## 2023-01-31 DIAGNOSIS — R09.82 POST-NASAL DRIP: ICD-10-CM

## 2023-01-31 DIAGNOSIS — R05.2 SUBACUTE COUGH: Primary | ICD-10-CM

## 2023-01-31 PROCEDURE — 99215 OFFICE O/P EST HI 40 MIN: CPT | Performed by: FAMILY MEDICINE

## 2023-01-31 PROCEDURE — 71046 X-RAY EXAM CHEST 2 VIEWS: CPT | Mod: TC | Performed by: RADIOLOGY

## 2023-01-31 RX ORDER — LORATADINE 10 MG/1
10 TABLET ORAL DAILY
Qty: 14 TABLET | Refills: 0 | Status: SHIPPED | OUTPATIENT
Start: 2023-01-31 | End: 2023-02-14

## 2023-01-31 RX ORDER — FLUTICASONE PROPIONATE 50 MCG
1 SPRAY, SUSPENSION (ML) NASAL DAILY
Qty: 9.9 ML | Refills: 0 | Status: SHIPPED | OUTPATIENT
Start: 2023-01-31 | End: 2023-02-14

## 2023-01-31 ASSESSMENT — PATIENT HEALTH QUESTIONNAIRE - PHQ9
SUM OF ALL RESPONSES TO PHQ QUESTIONS 1-9: 9
10. IF YOU CHECKED OFF ANY PROBLEMS, HOW DIFFICULT HAVE THESE PROBLEMS MADE IT FOR YOU TO DO YOUR WORK, TAKE CARE OF THINGS AT HOME, OR GET ALONG WITH OTHER PEOPLE: SOMEWHAT DIFFICULT
SUM OF ALL RESPONSES TO PHQ QUESTIONS 1-9: 9

## 2023-01-31 ASSESSMENT — ENCOUNTER SYMPTOMS
FATIGUE: 1
COUGH: 1

## 2023-01-31 ASSESSMENT — ASTHMA QUESTIONNAIRES: ACT_TOTALSCORE: 14

## 2023-01-31 NOTE — PROGRESS NOTES
Assessment & Plan     Subacute cough  Post-nasal drip  -Persistent symptoms 2.5 months  -Rhonchi left lower lobe, discussed repeat CXR to r/o PNA given symptoms  -CXR appears normal, formal read pending  -Discussed that post-nasal drip likely contributing to persistent cough. Discussed trying fluticasone nasal spray and antihistamine for the next 7-14 days. Follow-up next week for monitoring  - XR Chest 2 Views  - fluticasone (FLONASE) 50 MCG/ACT nasal spray  Dispense: 9.9 mL; Refill: 0  - loratadine (CLARITIN) 10 MG tablet  Dispense: 14 tablet; Refill: 0  - fluticasone (ARNUITY ELLIPTA) 50 MCG/ACT inhaler  Dispense: 30 each; Refill: 0    Mild persistent allergic asthma without complication  -ACT 14  -Discussed adding on steroid inhaler  -Will reevaluate symptoms at recheck visit next week  - fluticasone (FLONASE) 50 MCG/ACT nasal spray  Dispense: 9.9 mL; Refill: 0    48 minutes spent on the date of the encounter doing chart review, history and exam, documentation and further activities per the note    Wilber Chapin DO  [facility]    Buddy Bowman is a 62 year old, presenting for the following health issues:  Cough and Fatigue    Hx of asthma flares with URI.  Patient has had intermittent URI symptoms since Thanksgiving, has been seen in  3 times since. Was treated on 12/22 with a zpak and oral prednisone. Symptoms improved but returned. Last seen on 12/30, had negative CXR and treated with doxycycline for bronchitis.     Patient still having intermittent cough, congestion and post-nasal drip. Using her albuterol inhaler about two times a day with some relief. Cough seems to be worse at night. Cough causes a gurgling sound in there throat. Using mucinex and Delsym for symptoms. Feeling tired and fatigued. Denies fever, chills, wheezing, sinus pain/pressure, sore throat, weight loss.     Wt Readings from Last 4 Encounters:   01/31/23 110.2 kg (243 lb)   12/02/22 108.9 kg (240 lb)  "  05/10/22 109.3 kg (241 lb)   04/26/22 109.3 kg (241 lb)     Cough    Fatigue  Associated symptoms include coughing and fatigue.   History of Present Illness       Reason for visit:  Cough and congestion    She eats 4 or more servings of fruits and vegetables daily.She consumes 1 sweetened beverage(s) daily.She exercises with enough effort to increase her heart rate 9 or less minutes per day.  She exercises with enough effort to increase her heart rate 3 or less days per week. She is missing 1 dose(s) of medications per week.    Today's PHQ-9         PHQ-9 Total Score: 9    PHQ-9 Q9 Thoughts of better off dead/self-harm past 2 weeks :   Not at all    How difficult have these problems made it for you to do your work, take care of things at home, or get along with other people: Somewhat difficult       Review of Systems   Constitutional: Positive for fatigue.   Respiratory: Positive for cough.             Objective    /79 (BP Location: Right arm, Patient Position: Sitting, Cuff Size: Adult Large)   Pulse 63   Temp 97.4  F (36.3  C) (Temporal)   Resp 16   Ht 1.75 m (5' 8.9\")   Wt 110.2 kg (243 lb)   LMP  (LMP Unknown)   SpO2 98%   BMI 35.99 kg/m    Body mass index is 35.99 kg/m .  Physical Exam   GENERAL: healthy, alert and no distress  EYES: Eyes grossly normal to inspection, PERRL and conjunctivae and sclerae normal  HENT: ear canals and TM's normal, nose and mouth without ulcers or lesions  NECK: no adenopathy, no asymmetry, masses, or scars and thyroid normal to palpation  RESP: no rales , no wheezes and rhonchi L lower posterior  CV: regular rate and rhythm, normal S1 S2, no S3 or S4, no murmur, click or rub, no peripheral edema and peripheral pulses strong  SKIN: no suspicious lesions or rashes  NEURO: Normal strength and tone, mentation intact and speech normal  PSYCH: mentation appears normal, affect normal/bright            "

## 2023-02-02 RX ORDER — FLUTICASONE PROPIONATE 50 MCG
SPRAY, SUSPENSION (ML) NASAL
Qty: 48 G | OUTPATIENT
Start: 2023-02-02

## 2023-02-02 NOTE — TELEPHONE ENCOUNTER
Duplicate.    Med refused as requested 90 day supply not appropriate.  1/31/23 order stated it is for 14 days.    IRVIN CastanonN, RN-BC  [facility]

## 2023-03-30 ENCOUNTER — OFFICE VISIT (OUTPATIENT)
Dept: URGENT CARE | Facility: URGENT CARE | Age: 63
End: 2023-03-30
Payer: COMMERCIAL

## 2023-03-30 VITALS
OXYGEN SATURATION: 97 % | BODY MASS INDEX: 35.99 KG/M2 | TEMPERATURE: 97.5 F | DIASTOLIC BLOOD PRESSURE: 88 MMHG | WEIGHT: 243 LBS | HEART RATE: 82 BPM | SYSTOLIC BLOOD PRESSURE: 133 MMHG

## 2023-03-30 DIAGNOSIS — R07.0 THROAT PAIN: ICD-10-CM

## 2023-03-30 DIAGNOSIS — J02.0 STREPTOCOCCAL PHARYNGITIS: Primary | ICD-10-CM

## 2023-03-30 LAB — DEPRECATED S PYO AG THROAT QL EIA: POSITIVE

## 2023-03-30 PROCEDURE — 99213 OFFICE O/P EST LOW 20 MIN: CPT | Performed by: STUDENT IN AN ORGANIZED HEALTH CARE EDUCATION/TRAINING PROGRAM

## 2023-03-30 PROCEDURE — 87880 STREP A ASSAY W/OPTIC: CPT

## 2023-03-30 RX ORDER — AMOXICILLIN 500 MG/1
500 CAPSULE ORAL 2 TIMES DAILY
Qty: 20 CAPSULE | Refills: 0 | Status: SHIPPED | OUTPATIENT
Start: 2023-03-30 | End: 2023-04-09

## 2023-03-30 NOTE — PATIENT INSTRUCTIONS
Your strep test was positive.  You will need to be on antibiotic treatment for 24 hours before returning to school/work.  Change your toothbrush in 3 days to prevent reinfection.  For your sore throat, you may try tylenol/ibuprofen, salt water gargles, throat lozenges, using humidifier, warm beverages.  Make sure to drink plenty of fluids and wash your hands often.  Follow-up with your PCP if you have continued pain in 3-5 days.

## 2023-03-30 NOTE — PROGRESS NOTES
ASSESSMENT & PLAN:   Diagnoses and all orders for this visit:  Streptococcal pharyngitis  -     amoxicillin (AMOXIL) 500 MG capsule; Take 1 capsule (500 mg) by mouth 2 times daily for 10 days  Throat pain  -     Streptococcus A Rapid Screen w/Reflex to PCR - Clinic Collect    Rapid strep positive -start amoxicillin x10 days. Symptomatic treatment with OTC analgesics, salt water gargles, throat lozenges.    No follow-ups on file.    Patient Instructions   Your strep test was positive.  You will need to be on antibiotic treatment for 24 hours before returning to school/work.  Change your toothbrush in 3 days to prevent reinfection.  For your sore throat, you may try tylenol/ibuprofen, salt water gargles, throat lozenges, using humidifier, warm beverages.  Make sure to drink plenty of fluids and wash your hands often.  Follow-up with your PCP if you have continued pain in 3-5 days.       At the end of the encounter, I discussed results, diagnosis, medications. Discussed red flags for immediate return to clinic/ER, as well as indications for follow up if no improvement. Patient and/or caregiver understood and agreed to plan. Patient was stable for discharge.    ------------------------------------------------------------------------  SUBJECTIVE  Patient presents with:  Urgent Care  Pharyngitis: C/O sore throat for 1 day    HPI  Gracie Roxie Parra is a(n) 62 year old female presenting to clinic today for sore throat that began yesterday. Also has rhinorrhea. No fever or cough. She has been around her grandchildren who were sick.    Review of Systems    Current Outpatient Medications   Medication Sig Dispense Refill     amoxicillin (AMOXIL) 500 MG capsule Take 1 capsule (500 mg) by mouth 2 times daily for 10 days 20 capsule 0     buPROPion (WELLBUTRIN XL) 300 MG 24 hr tablet Take 1 tablet (300 mg) by mouth every morning 90 tablet 3     metoprolol succinate ER (TOPROL-XL) 50 MG 24 hr tablet Take 1 tablet (50 mg) by  mouth daily 90 tablet 3     sertraline (ZOLOFT) 50 MG tablet Take 1.5 tablets (75 mg) by mouth daily 135 tablet 3     albuterol (PROAIR HFA/PROVENTIL HFA/VENTOLIN HFA) 108 (90 Base) MCG/ACT inhaler Inhale 2 puffs into the lungs every 4 hours as needed for shortness of breath / dyspnea or wheezing 18 g 0     Problem List:  2017-07: Malignant neoplasm of uterus (H)  2017-06: Adjustment disorder with mixed anxiety and depressed mood  2017-06: Post-concussion headache  2012-08: Vitamin D deficiency  2012-06: Major depressive disorder, recurrent (H)  2012-06: Anxiety state  2012-06: Eating disorder  2012-03: HTN (hypertension)  2010-06: Depression with anxiety  2007-05: Other atopic dermatitis and related conditions  2007-04: Attention deficit hyperactivity disorder (ADHD)    Allergies   Allergen Reactions     Amlodipine      Other reaction(s): Edema,generalized     Lisinopril Cough         OBJECTIVE  Vitals:    03/30/23 1729   BP: 133/88   Pulse: 82   Temp: 97.5  F (36.4  C)   TempSrc: Tympanic   SpO2: 97%   Weight: 110.2 kg (243 lb)     Physical Exam   GENERAL: healthy, alert, no acute distress.   HEAD: normocephalic, atraumatic.  EYE: PERRL. EOMs intact. No scleral injection bilaterally.   EAR: external ear normal. Bilateral ear canals normal and nonpainful. Bilateral TM intact, pearly, translucent without bulging.  NOSE: external nose atraumatic without lesions.  OROPHARYNX: moist mucous membranes. Oropharynx with moderate erythema. No exudate.  Uvula midline. Patent airway.  NECK: bilateral submandibular adenopathy.  LUNGS: no increased work of breathing. Clear lung sounds bilaterally. No wheezing, rhonchi, or rales.   CV: regular rate and rhythm. No clicks, murmurs, or rubs.    Results for orders placed or performed in visit on 03/30/23   Streptococcus A Rapid Screen w/Reflex to PCR - Clinic Collect     Status: Abnormal    Specimen: Throat; Swab   Result Value Ref Range    Group A Strep antigen Positive (A)  Negative

## 2023-04-01 ENCOUNTER — HEALTH MAINTENANCE LETTER (OUTPATIENT)
Age: 63
End: 2023-04-01

## 2023-07-12 ENCOUNTER — TRANSFERRED RECORDS (OUTPATIENT)
Dept: HEALTH INFORMATION MANAGEMENT | Facility: CLINIC | Age: 63
End: 2023-07-12
Payer: COMMERCIAL

## 2023-08-27 ENCOUNTER — OFFICE VISIT (OUTPATIENT)
Dept: URGENT CARE | Facility: URGENT CARE | Age: 63
End: 2023-08-27
Payer: COMMERCIAL

## 2023-08-27 VITALS
HEIGHT: 70 IN | WEIGHT: 250 LBS | DIASTOLIC BLOOD PRESSURE: 81 MMHG | OXYGEN SATURATION: 98 % | SYSTOLIC BLOOD PRESSURE: 145 MMHG | HEART RATE: 58 BPM | TEMPERATURE: 97.2 F | BODY MASS INDEX: 35.79 KG/M2

## 2023-08-27 DIAGNOSIS — H10.9 CONJUNCTIVITIS OF LEFT EYE, UNSPECIFIED CONJUNCTIVITIS TYPE: Primary | ICD-10-CM

## 2023-08-27 PROCEDURE — 99213 OFFICE O/P EST LOW 20 MIN: CPT | Performed by: FAMILY MEDICINE

## 2023-08-27 RX ORDER — POLYMYXIN B SULFATE AND TRIMETHOPRIM 1; 10000 MG/ML; [USP'U]/ML
1-2 SOLUTION OPHTHALMIC EVERY 6 HOURS
Qty: 10 ML | Refills: 0 | Status: SHIPPED | OUTPATIENT
Start: 2023-08-27 | End: 2023-09-03

## 2023-08-27 RX ORDER — CETIRIZINE HYDROCHLORIDE 10 MG/1
10 TABLET ORAL DAILY
COMMUNITY

## 2023-08-27 ASSESSMENT — ENCOUNTER SYMPTOMS
EYE REDNESS: 1
FEVER: 0
EYE PAIN: 1

## 2023-08-27 NOTE — PROGRESS NOTES
"  Assessment & Plan     Conjunctivitis of left eye, unspecified conjunctivitis type  Acute problem.  Do not suspect pre or postseptal cellulitis.  Not worn contacts in over 2 months, okay to use Polytrim.  Avoid using contacts when having active infection.  - trimethoprim-polymyxin b (POLYTRIM) 48956-4.1 UNIT/ML-% ophthalmic solution  Dispense: 10 mL; Refill: 0      Return in about 1 week (around 9/3/2023) for If symptoms do not improve or gets worse..    Doug Camargo MD  [facility] [place]    Buddy Bowman is a 63 year old, presenting for the following health issues:  Urgent Care and Eye Problem (Pt in clinic to have eval for left eye redness, irritation and discharge.)      HPI       63-year-old female who presents to urgent care with concerns of left eye redness and pain.  No fevers.              Review of Systems   Constitutional:  Negative for fever.   Eyes:  Positive for pain and redness.            Objective    BP (!) 145/81   Pulse 58   Temp 97.2  F (36.2  C) (Temporal)   Ht 1.765 m (5' 9.5\")   Wt 113.4 kg (250 lb)   LMP  (LMP Unknown)   SpO2 98%   BMI 36.39 kg/m    Body mass index is 36.39 kg/m .  Physical Exam  Eyes:      Pupils: Pupils are equal, round, and reactive to light.      Comments: Left eye conjunctival injection, no hyphema or hypopyon.  No pain with extraocular motion.                              "

## 2023-09-08 ENCOUNTER — OFFICE VISIT (OUTPATIENT)
Dept: FAMILY MEDICINE | Facility: CLINIC | Age: 63
End: 2023-09-08
Payer: COMMERCIAL

## 2023-09-08 VITALS
SYSTOLIC BLOOD PRESSURE: 134 MMHG | OXYGEN SATURATION: 96 % | HEART RATE: 69 BPM | WEIGHT: 248.8 LBS | RESPIRATION RATE: 18 BRPM | HEIGHT: 69 IN | BODY MASS INDEX: 36.85 KG/M2 | DIASTOLIC BLOOD PRESSURE: 76 MMHG | TEMPERATURE: 97.2 F

## 2023-09-08 DIAGNOSIS — F90.9 ATTENTION DEFICIT HYPERACTIVITY DISORDER (ADHD), UNSPECIFIED ADHD TYPE: ICD-10-CM

## 2023-09-08 DIAGNOSIS — Z01.818 PREOP GENERAL PHYSICAL EXAM: Primary | ICD-10-CM

## 2023-09-08 DIAGNOSIS — M17.11 PRIMARY OSTEOARTHRITIS OF RIGHT KNEE: ICD-10-CM

## 2023-09-08 DIAGNOSIS — R60.0 BILATERAL LEG EDEMA: ICD-10-CM

## 2023-09-08 DIAGNOSIS — I10 PRIMARY HYPERTENSION: ICD-10-CM

## 2023-09-08 DIAGNOSIS — F33.1 MODERATE EPISODE OF RECURRENT MAJOR DEPRESSIVE DISORDER (H): ICD-10-CM

## 2023-09-08 LAB
ANION GAP SERPL CALCULATED.3IONS-SCNC: 10 MMOL/L (ref 7–15)
BUN SERPL-MCNC: 14.1 MG/DL (ref 8–23)
CALCIUM SERPL-MCNC: 9.3 MG/DL (ref 8.8–10.2)
CHLORIDE SERPL-SCNC: 104 MMOL/L (ref 98–107)
CREAT SERPL-MCNC: 0.92 MG/DL (ref 0.51–0.95)
DEPRECATED HCO3 PLAS-SCNC: 25 MMOL/L (ref 22–29)
EGFRCR SERPLBLD CKD-EPI 2021: 70 ML/MIN/1.73M2
ERYTHROCYTE [DISTWIDTH] IN BLOOD BY AUTOMATED COUNT: 16.5 % (ref 10–15)
GLUCOSE SERPL-MCNC: 98 MG/DL (ref 70–99)
HCT VFR BLD AUTO: 36.6 % (ref 35–47)
HGB BLD-MCNC: 12.2 G/DL (ref 11.7–15.7)
MCH RBC QN AUTO: 25.3 PG (ref 26.5–33)
MCHC RBC AUTO-ENTMCNC: 33.3 G/DL (ref 31.5–36.5)
MCV RBC AUTO: 76 FL (ref 78–100)
PLATELET # BLD AUTO: 150 10E3/UL (ref 150–450)
POTASSIUM SERPL-SCNC: 4.5 MMOL/L (ref 3.4–5.3)
RBC # BLD AUTO: 4.83 10E6/UL (ref 3.8–5.2)
SODIUM SERPL-SCNC: 139 MMOL/L (ref 136–145)
WBC # BLD AUTO: 5 10E3/UL (ref 4–11)

## 2023-09-08 PROCEDURE — 85027 COMPLETE CBC AUTOMATED: CPT | Performed by: FAMILY MEDICINE

## 2023-09-08 PROCEDURE — 93000 ELECTROCARDIOGRAM COMPLETE: CPT | Performed by: FAMILY MEDICINE

## 2023-09-08 PROCEDURE — 96127 BRIEF EMOTIONAL/BEHAV ASSMT: CPT | Performed by: FAMILY MEDICINE

## 2023-09-08 PROCEDURE — 99214 OFFICE O/P EST MOD 30 MIN: CPT | Performed by: FAMILY MEDICINE

## 2023-09-08 PROCEDURE — 36415 COLL VENOUS BLD VENIPUNCTURE: CPT | Performed by: FAMILY MEDICINE

## 2023-09-08 PROCEDURE — 80048 BASIC METABOLIC PNL TOTAL CA: CPT | Performed by: FAMILY MEDICINE

## 2023-09-08 RX ORDER — METHYLPHENIDATE HYDROCHLORIDE 36 MG/1
36 TABLET ORAL DAILY
Qty: 30 TABLET | Refills: 0 | Status: SHIPPED | OUTPATIENT
Start: 2023-10-09 | End: 2023-11-08

## 2023-09-08 RX ORDER — METHYLPHENIDATE HYDROCHLORIDE 36 MG/1
36 TABLET ORAL DAILY
Qty: 30 TABLET | Refills: 0 | Status: SHIPPED | OUTPATIENT
Start: 2023-11-09 | End: 2023-12-09

## 2023-09-08 RX ORDER — METHYLPHENIDATE HYDROCHLORIDE 36 MG/1
36 TABLET ORAL DAILY
Qty: 30 TABLET | Refills: 0 | Status: SHIPPED | OUTPATIENT
Start: 2023-09-08 | End: 2023-10-08

## 2023-09-08 ASSESSMENT — PATIENT HEALTH QUESTIONNAIRE - PHQ9
10. IF YOU CHECKED OFF ANY PROBLEMS, HOW DIFFICULT HAVE THESE PROBLEMS MADE IT FOR YOU TO DO YOUR WORK, TAKE CARE OF THINGS AT HOME, OR GET ALONG WITH OTHER PEOPLE: SOMEWHAT DIFFICULT
SUM OF ALL RESPONSES TO PHQ QUESTIONS 1-9: 3
SUM OF ALL RESPONSES TO PHQ QUESTIONS 1-9: 3

## 2023-09-08 ASSESSMENT — ASTHMA QUESTIONNAIRES: ACT_TOTALSCORE: 25

## 2023-09-08 NOTE — PATIENT INSTRUCTIONS
For informational purposes only. Not to replace the advice of your health care provider. Copyright   2003,  [employer] [facility]. All rights reserved. Clinically reviewed by Smita Lackey MD. [employer] [id number] - REV .  Preparing for Your Surgery  Getting started  A nurse will call you to review your health history and instructions. They will give you an arrival time based on your scheduled surgery time. Please be ready to share:  Your doctor's clinic name and phone number  Your medical, surgical, and anesthesia history  A list of allergies and sensitivities  A list of medicines, including herbal treatments and over-the-counter drugs  Whether the patient has a legal guardian (ask how to send us the papers in advance)  Please tell us if you're pregnant--or if there's any chance you might be pregnant. Some surgeries may injure a fetus (unborn baby), so they require a pregnancy test. Surgeries that are safe for a fetus don't always need a test, and you can choose whether to have one.   If you have a child who's having surgery, please ask for a copy of Preparing for Your Child's Surgery.    Preparing for surgery  Within 10 to 30 days of surgery: Have a pre-op exam (sometimes called an H&P, or History and Physical). This can be done at a clinic or pre-operative center.  If you're having a , you may not need this exam. Talk to your care team.  At your pre-op exam, talk to your care team about all medicines you take. If you need to stop any medicines before surgery, ask when to start taking them again.  We do this for your safety. Many medicines can make you bleed too much during surgery. Some change how well surgery (anesthesia) drugs work.  Call your insurance company to let them know you're having surgery. (If you don't have insurance, call [phone].)  Call your clinic if there's any change in your health. This includes signs of a cold or flu (sore throat, runny nose, cough, rash, fever). It also  includes a scrape or scratch near the surgery site.  If you have questions on the day of surgery, call your hospital or surgery center.  Eating and drinking guidelines  For your safety: Unless your surgeon tells you otherwise, follow the guidelines below.  Eat and drink as usual until 8 hours before you arrive for surgery. After that, no food or milk.  Drink clear liquids until 2 hours before you arrive. These are liquids you can see through, like water, Gatorade, and Propel Water. They also include plain black coffee and tea (no cream or milk), candy, and breath mints. You can spit out gum when you arrive.  If you drink alcohol: Stop drinking it the night before surgery.  If your care team tells you to take medicine on the morning of surgery, it's okay to take it with a sip of water.  Preventing infection  Shower or bathe the night before and morning of your surgery. Follow the instructions your clinic gave you. (If no instructions, use regular soap.)  Don't shave or clip hair near your surgery site. We'll remove the hair if needed.  Don't smoke or vape the morning of surgery. You may chew nicotine gum up to 2 hours before surgery. A nicotine patch is okay.  Note: Some surgeries require you to completely quit smoking and nicotine. Check with your surgeon.  Your care team will make every effort to keep you safe from infection. We will:  Clean our hands often with soap and water (or an alcohol-based hand rub).  Clean the skin at your surgery site with a special soap that kills germs.  Give you a special gown to keep you warm. (Cold raises the risk of infection.)  Wear special hair covers, masks, gowns and gloves during surgery.  Give antibiotic medicine, if prescribed. Not all surgeries need antibiotics.  What to bring on the day of surgery  Photo ID and insurance card  Copy of your health care directive, if you have one  Glasses and hearing aids (bring cases)  You can't wear contacts during surgery  Inhaler and eye  drops, if you use them (tell us about these when you arrive)  CPAP machine or breathing device, if you use them  A few personal items, if spending the night  If you have . . .  A pacemaker, ICD (cardiac defibrillator) or other implant: Bring the ID card.  An implanted stimulator: Bring the remote control.  A legal guardian: Bring a copy of the certified (court-stamped) guardianship papers.  Please remove any jewelry, including body piercings. Leave jewelry and other valuables at home.  If you're going home the day of surgery  You must have a responsible adult drive you home. They should stay with you overnight as well.  If you don't have someone to stay with you, and you aren't safe to go home alone, we may keep you overnight. Insurance often won't pay for this.  After surgery  If it's hard to control your pain or you need more pain medicine, please call your surgeon's office.  Questions?   If you have any questions for your care team, list them here: _________________________________________________________________________________________________________________________________________________________________________ ____________________________________ ____________________________________ ____________________________________    How to Take Your Medication Before Surgery  - Take all of your medications before surgery as usual  - HOLD (do not take) Concerta on the morning of the surgery, ok to resume after surgery  - STOP taking all vitamins and herbal supplements 14 days before surgery.  - Stop Aspirin 7 days before surgery, Aleve for 4 days, Advil for 1 day before surgery

## 2023-09-08 NOTE — PROGRESS NOTES
[address]  [address]  [place] [address]  Phone: [phone]  Fax: [phone]  Primary Provider: Hannah Guillen Y  Pre-op Performing Provider: HANNAH GUILLEN      PREOPERATIVE EVALUATION:  Today's date: 9/8/2023    Gracie Parra is a 63 year old female who presents for a preoperative evaluation.      9/8/2023    10:56 AM   Additional Questions   Roomed by YANCY RN     Pre-Op Exam and peripheral edema    Surgical Information:  Surgery/Procedure: Right Total Knee Arthroplasty  Surgery Location: [facility] -- [place]  Surgeon: Lloyd Lucero MD  Surgery Date: 9/18/23  Time of Surgery: TBD  Where patient plans to recover: Other: Overnight in post-op suite and then home with spouse  Fax number for surgical facility: Available to fax within EPIC    Assessment & Plan     The proposed surgical procedure is considered INTERMEDIATE risk.    1. Preop general physical exam  - EKG 12-lead complete w/read - Clinics  - CBC with platelets; Future  - Basic metabolic panel  (Ca, Cl, CO2, Creat, Gluc, K, Na, BUN); Future  - CBC with platelets  - Basic metabolic panel  (Ca, Cl, CO2, Creat, Gluc, K, Na, BUN)    2. Primary osteoarthritis of right knee    3. Moderate episode of recurrent major depressive disorder (H)    4. Primary hypertension    5. Attention deficit hyperactivity disorder (ADHD)  - restart medication  - e-visit in 6 months   - methylphenidate HCl ER, OSM, (CONCERTA) 36 MG CR tablet; Take 1 tablet (36 mg) by mouth daily for 30 days  Dispense: 30 tablet; Refill: 0  - methylphenidate HCl ER, OSM, (CONCERTA) 36 MG CR tablet; Take 1 tablet (36 mg) by mouth daily for 30 days  Dispense: 30 tablet; Refill: 0  - methylphenidate HCl ER, OSM, (CONCERTA) 36 MG CR tablet; Take 1 tablet (36 mg) by mouth daily for 30 days  Dispense: 30 tablet; Refill: 0    6. Bilateral leg edema  - Compression Sleeve/Stocking Order for DME - ONLY FOR DME            - No identified  additional risk factors other than previously addressed    Antiplatelet or Anticoagulation Medication Instructions:   - Patient is on no antiplatelet or anticoagulation medications.    Additional Medication Instructions:  Patient is to take all scheduled medications on the day of surgery EXCEPT for modifications listed below:  How to Take Your Medication Before Surgery  - Take all of your medications before surgery as usual  - HOLD (do not take) Concerta on the morning of the surgery, ok to resume after surgery  - STOP taking all vitamins and herbal supplements 14 days before surgery.  - Stop Aspirin 7 days before surgery, Aleve for 4 days, Advil for 1 day before surgery     RECOMMENDATION:  APPROVAL GIVEN to proceed with proposed procedure, without further diagnostic evaluation.      Subjective       HPI related to upcoming procedure: right knee osteoarthritis         9/8/2023     1:44 AM   Preop Questions   1. Have you ever had a heart attack or stroke? No   2. Have you ever had surgery on your heart or blood vessels, such as a stent placement, a coronary artery bypass, or surgery on an artery in your head, neck, heart, or legs? No   3. Do you have chest pain with activity? No   4. Do you have a history of  heart failure? No   5. Do you currently have a cold, bronchitis or symptoms of other infection? No   6. Do you have a cough, shortness of breath, or wheezing? No   7. Do you or anyone in your family have previous history of blood clots? No   8. Do you or does anyone in your family have a serious bleeding problem such as prolonged bleeding following surgeries or cuts? No   9. Have you ever had problems with anemia or been told to take iron pills? No   10. Have you had any abnormal blood loss such as black, tarry or bloody stools, or abnormal vaginal bleeding? No   11. Have you ever had a blood transfusion? No   12. Are you willing to have a blood transfusion if it is medically needed before, during, or after  your surgery? Yes   13. Have you or any of your relatives ever had problems with anesthesia? No   14. Do you have sleep apnea, excessive snoring or daytime drowsiness? No   15. Do you have any artifical heart valves or other implanted medical devices like a pacemaker, defibrillator, or continuous glucose monitor? No   16. Do you have artificial joints? YES    17. Are you allergic to latex? No       Health Care Directive:  Patient does not have a Health Care Directive or Living Will: Discussed advance care planning with patient; information given to patient to review.    Preoperative Review of :   reviewed - no record of controlled substances prescribed.      Status of Chronic Conditions:  See problem list for active medical problems.  Problems all longstanding and stable, except as noted/documented.  See ROS for pertinent symptoms related to these conditions.    Review of Systems  CONSTITUTIONAL: NEGATIVE for fever, chills, change in weight  INTEGUMENTARY/SKIN: NEGATIVE for worrisome rashes, moles or lesions  EYES: NEGATIVE for vision changes or irritation  ENT/MOUTH: NEGATIVE for ear, mouth and throat problems  RESP: NEGATIVE for significant cough or SOB  CV: NEGATIVE for chest pain, palpitations or peripheral edema  GI: NEGATIVE for nausea, abdominal pain, heartburn, or change in bowel habits  : NEGATIVE for frequency, dysuria, or hematuria  MUSCULOSKELETAL: + right knee pain   NEURO: NEGATIVE for weakness, dizziness or paresthesias  ENDOCRINE: NEGATIVE for temperature intolerance, skin/hair changes  HEME: NEGATIVE for bleeding problems  PSYCHIATRIC: NEGATIVE for changes in mood or affect    Patient Active Problem List    Diagnosis Date Noted    Malignant neoplasm of uterus (H) 07/20/2017     Priority: Medium    Adjustment disorder with mixed anxiety and depressed mood 06/02/2017     Priority: Medium    Post-concussion headache 06/02/2017     Priority: Medium    Major depressive disorder, recurrent (H)  06/12/2012     Priority: Medium    Eating disorder 06/12/2012     Priority: Medium     Problem list name updated by automated process. Provider to review      HTN (hypertension) 03/01/2012     Priority: Medium    Depression with anxiety 06/04/2010     Priority: Medium    Other atopic dermatitis and related conditions 05/04/2007     Priority: Medium    Attention deficit hyperactivity disorder (ADHD) 04/17/2007     Priority: Medium      Past Medical History:   Diagnosis Date    ADHD     Arthritis 25+ year    Asthma     Cancer (H) 4 yrs ago    Uterine cancer    Depression     Depressive disorder Since childhood    In remission    Hypertension     Psoriasis      Past Surgical History:   Procedure Laterality Date    ABDOMEN SURGERY  4 years ago    Uterine cancer    GENITOURINARY SURGERY  4 yrs ago    Hysterectomy    HYSTERECTOMY      HYSTERECTOMY, PAP NO LONGER INDICATED      KNEE SURGERY      ORTHOPEDIC SURGERY  2019    Rotator cuff    URETHRA SURGERY       Current Outpatient Medications   Medication Sig Dispense Refill    cetirizine (ZYRTEC) 10 MG tablet Take 10 mg by mouth daily      Methylphenidate HCl (CONCERTA PO)       metoprolol succinate ER (TOPROL XL) 50 MG 24 hr tablet TAKE 1 TABLET(50 MG) BY MOUTH DAILY 90 tablet 3    albuterol (PROAIR HFA/PROVENTIL HFA/VENTOLIN HFA) 108 (90 Base) MCG/ACT inhaler Inhale 2 puffs into the lungs every 4 hours as needed for shortness of breath / dyspnea or wheezing 18 g 0       Allergies   Allergen Reactions    Amlodipine      Other reaction(s): Edema,generalized    Lisinopril Cough        Social History     Tobacco Use    Smoking status: Never    Smokeless tobacco: Never   Substance Use Topics    Alcohol use: Yes     Comment: One drink 1-2 times a month     Family History   Problem Relation Age of Onset    Brain Cancer Mother     Other Cancer Mother         Glioblastoma    Anxiety Disorder Mother     Thyroid Disease Mother     Depression Mother     Alzheimer Disease Father      "Diabetes Father     Hypertension Father     Prostate Cancer Father     Mental Illness Father         Bipolar    Obesity Father     Depression Father     Bipolar Disorder Father     Skin Cancer Father     Dementia Father     Kidney Disease Father      History   Drug Use No         Objective     /76 (BP Location: Right arm, Patient Position: Sitting, Cuff Size: Adult Large)   Pulse 69   Temp 97.2  F (36.2  C) (Temporal)   Resp 18   Ht 1.745 m (5' 8.7\")   Wt 112.9 kg (248 lb 12.8 oz)   LMP  (LMP Unknown)   SpO2 96%   BMI 37.06 kg/m      Physical Exam  /76 (BP Location: Right arm, Patient Position: Sitting, Cuff Size: Adult Large)   Pulse 69   Temp 97.2  F (36.2  C) (Temporal)   Resp 18   Ht 1.745 m (5' 8.7\")   Wt 112.9 kg (248 lb 12.8 oz)   LMP  (LMP Unknown)   SpO2 96%   BMI 37.06 kg/m       GENERAL APPEARANCE: healthy, alert and no distress     EYES: EOMI     HENT: ear canals and TM's normal      NECK: no adenopathy, no asymmetry, masses, or scars and thyroid normal to palpation     RESP: lungs clear to auscultation - no rales, rhonchi or wheezes     CV: regular rates and rhythm, normal S1 S2     ABDOMEN:  soft, nontender, no HSM or masses and bowel sounds normal     SKIN: no suspicious lesions or rashes     NEURO: Normal strength and tone, sensory exam grossly normal, mentation intact and speech normal     PSYCH: mentation appears normal. and affect normal     LYMPHATICS: No cervical adenopathy    Recent Labs   Lab Test 04/26/22  0806   HGB 10.6*         POTASSIUM 4.1   CR 0.93        Diagnostics:  Recent Results (from the past 168 hour(s))   CBC with platelets    Collection Time: 09/08/23 12:23 PM   Result Value Ref Range    WBC Count 5.0 4.0 - 11.0 10e3/uL    RBC Count 4.83 3.80 - 5.20 10e6/uL    Hemoglobin 12.2 11.7 - 15.7 g/dL    Hematocrit 36.6 35.0 - 47.0 %    MCV 76 (L) 78 - 100 fL    MCH 25.3 (L) 26.5 - 33.0 pg    MCHC 33.3 31.5 - 36.5 g/dL    RDW 16.5 (H) 10.0 - 15.0 " %    Platelet Count 150 150 - 450 10e3/uL   Basic metabolic panel  (Ca, Cl, CO2, Creat, Gluc, K, Na, BUN)    Collection Time: 09/08/23 12:23 PM   Result Value Ref Range    Sodium 139 136 - 145 mmol/L    Potassium 4.5 3.4 - 5.3 mmol/L    Chloride 104 98 - 107 mmol/L    Carbon Dioxide (CO2) 25 22 - 29 mmol/L    Anion Gap 10 7 - 15 mmol/L    Urea Nitrogen 14.1 8.0 - 23.0 mg/dL    Creatinine 0.92 0.51 - 0.95 mg/dL    Calcium 9.3 8.8 - 10.2 mg/dL    Glucose 98 70 - 99 mg/dL    GFR Estimate 70 >60 mL/min/1.73m2      EKG: sinus bradycardia, normal axis, normal intervals, no acute ST/T changes c/w ischemia, no LVH by voltage criteria    Revised Cardiac Risk Index (RCRI):  The patient has the following serious cardiovascular risks for perioperative complications:   - No serious cardiac risks = 0 points     RCRI Interpretation: 0 points: Class I (very low risk - 0.4% complication rate)         Signed Electronically by: Hannah Guillen MD  Copy of this evaluation report is provided to requesting physician.    Answers submitted by the patient for this visit:  Patient Health Questionnaire (Submitted on 9/8/2023)  If you checked off any problems, how difficult have these problems made it for you to do your work, take care of things at home, or get along with other people?: Somewhat difficult  PHQ9 TOTAL SCORE: 3

## 2023-09-08 NOTE — LETTER
9/8/2023        RE: Gracie Parra  [address]  [place] [address]        [facility]  [address]  [address]  [place] [address]  Phone: [phone]  Fax: [phone]  Primary Provider: Hannah Guillen  Pre-op Performing Provider: HANNAH GUILLEN Y    {Provider  Link to PREOP SmartSet  Use this to apply standard patient instructions to AVS; includes medication directions, common orders, guidelines for anemia, warfarin, additional testing   :611623}  PREOPERATIVE EVALUATION:  Today's date: 9/8/2023    Gracie Parra is a 63 year old female who presents for a preoperative evaluation.      9/8/2023    10:56 AM   Additional Questions   Roomed by YANCY RN     Pre-Op Exam and peripheral edema    Surgical Information:  Surgery/Procedure: Right Total Knee Arthroplasty  Surgery Location: [facility] [place]  Surgeon: Lloyd Lucero MD  Surgery Date: 9/18/23  Time of Surgery: TBD  Where patient plans to recover: Other: Overnight in post-op suite and then home with spouse  Fax number for surgical facility: Available to fax within EPIC    Assessment & Plan    The proposed surgical procedure is considered INTERMEDIATE risk.    1. Preop general physical exam  - EKG 12-lead complete w/read - Clinics  - CBC with platelets; Future  - Basic metabolic panel  (Ca, Cl, CO2, Creat, Gluc, K, Na, BUN); Future  - CBC with platelets  - Basic metabolic panel  (Ca, Cl, CO2, Creat, Gluc, K, Na, BUN)    2. Primary osteoarthritis of right knee    3. Moderate episode of recurrent major depressive disorder (H)    4. Primary hypertension    5. Attention deficit hyperactivity disorder (ADHD)  - restart medication  - e-visit in 6 months   - methylphenidate HCl ER, OSM, (CONCERTA) 36 MG CR tablet; Take 1 tablet (36 mg) by mouth daily for 30 days  Dispense: 30 tablet; Refill: 0  - methylphenidate HCl ER, OSM, (CONCERTA) 36 MG CR tablet; Take 1 tablet (36 mg) by mouth daily  for 30 days  Dispense: 30 tablet; Refill: 0  - methylphenidate HCl ER, OSM, (CONCERTA) 36 MG CR tablet; Take 1 tablet (36 mg) by mouth daily for 30 days  Dispense: 30 tablet; Refill: 0    6. Bilateral leg edema  - Compression Sleeve/Stocking Order for DME - ONLY FOR DME            - No identified additional risk factors other than previously addressed    Antiplatelet or Anticoagulation Medication Instructions:   - Patient is on no antiplatelet or anticoagulation medications.    Additional Medication Instructions:  Patient is to take all scheduled medications on the day of surgery EXCEPT for modifications listed below:  How to Take Your Medication Before Surgery  - Take all of your medications before surgery as usual  - HOLD (do not take) Concerta on the morning of the surgery, ok to resume after surgery  - STOP taking all vitamins and herbal supplements 14 days before surgery.  - Stop Aspirin 7 days before surgery, Aleve for 4 days, Advil for 1 day before surgery     RECOMMENDATION:  APPROVAL GIVEN to proceed with proposed procedure, without further diagnostic evaluation.      Subjective      HPI related to upcoming procedure: right knee osteoarthritis         9/8/2023     1:44 AM   Preop Questions   1. Have you ever had a heart attack or stroke? No   2. Have you ever had surgery on your heart or blood vessels, such as a stent placement, a coronary artery bypass, or surgery on an artery in your head, neck, heart, or legs? No   3. Do you have chest pain with activity? No   4. Do you have a history of  heart failure? No   5. Do you currently have a cold, bronchitis or symptoms of other infection? No   6. Do you have a cough, shortness of breath, or wheezing? No   7. Do you or anyone in your family have previous history of blood clots? No   8. Do you or does anyone in your family have a serious bleeding problem such as prolonged bleeding following surgeries or cuts? No   9. Have you ever had problems with anemia or  been told to take iron pills? No   10. Have you had any abnormal blood loss such as black, tarry or bloody stools, or abnormal vaginal bleeding? No   11. Have you ever had a blood transfusion? No   12. Are you willing to have a blood transfusion if it is medically needed before, during, or after your surgery? Yes   13. Have you or any of your relatives ever had problems with anesthesia? No   14. Do you have sleep apnea, excessive snoring or daytime drowsiness? No   15. Do you have any artifical heart valves or other implanted medical devices like a pacemaker, defibrillator, or continuous glucose monitor? No   16. Do you have artificial joints? YES    17. Are you allergic to latex? No       Health Care Directive:  Patient does not have a Health Care Directive or Living Will: Discussed advance care planning with patient; information given to patient to review.    Preoperative Review of :   reviewed - no record of controlled substances prescribed.  {Review [place] for all patients per [employer] [place]NPMP Profile:476616}    Status of Chronic Conditions:  See problem list for active medical problems.  Problems all longstanding and stable, except as noted/documented.  See ROS for pertinent symptoms related to these conditions.    Review of Systems  CONSTITUTIONAL: NEGATIVE for fever, chills, change in weight  INTEGUMENTARY/SKIN: NEGATIVE for worrisome rashes, moles or lesions  EYES: NEGATIVE for vision changes or irritation  ENT/MOUTH: NEGATIVE for ear, mouth and throat problems  RESP: NEGATIVE for significant cough or SOB  CV: NEGATIVE for chest pain, palpitations or peripheral edema  GI: NEGATIVE for nausea, abdominal pain, heartburn, or change in bowel habits  : NEGATIVE for frequency, dysuria, or hematuria  MUSCULOSKELETAL: + right knee pain   NEURO: NEGATIVE for weakness, dizziness or paresthesias  ENDOCRINE: NEGATIVE for temperature intolerance, skin/hair changes  HEME: NEGATIVE for bleeding problems  PSYCHIATRIC:  NEGATIVE for changes in mood or affect    Patient Active Problem List    Diagnosis Date Noted     Malignant neoplasm of uterus (H) 07/20/2017     Priority: Medium     Adjustment disorder with mixed anxiety and depressed mood 06/02/2017     Priority: Medium     Post-concussion headache 06/02/2017     Priority: Medium     Major depressive disorder, recurrent (H) 06/12/2012     Priority: Medium     Eating disorder 06/12/2012     Priority: Medium     Problem list name updated by automated process. Provider to review       HTN (hypertension) 03/01/2012     Priority: Medium     Depression with anxiety 06/04/2010     Priority: Medium     Other atopic dermatitis and related conditions 05/04/2007     Priority: Medium     Attention deficit hyperactivity disorder (ADHD) 04/17/2007     Priority: Medium      Past Medical History:   Diagnosis Date     ADHD      Arthritis 25+ year     Asthma      Cancer (H) 4 yrs ago    Uterine cancer     Depression      Depressive disorder Since childhood    In remission     Hypertension      Psoriasis      Past Surgical History:   Procedure Laterality Date     ABDOMEN SURGERY  4 years ago    Uterine cancer     GENITOURINARY SURGERY  4 yrs ago    Hysterectomy     HYSTERECTOMY       HYSTERECTOMY, PAP NO LONGER INDICATED       KNEE SURGERY       ORTHOPEDIC SURGERY  2019    Rotator cuff     URETHRA SURGERY       Current Outpatient Medications   Medication Sig Dispense Refill     cetirizine (ZYRTEC) 10 MG tablet Take 10 mg by mouth daily       Methylphenidate HCl (CONCERTA PO)        metoprolol succinate ER (TOPROL XL) 50 MG 24 hr tablet TAKE 1 TABLET(50 MG) BY MOUTH DAILY 90 tablet 3     albuterol (PROAIR HFA/PROVENTIL HFA/VENTOLIN HFA) 108 (90 Base) MCG/ACT inhaler Inhale 2 puffs into the lungs every 4 hours as needed for shortness of breath / dyspnea or wheezing 18 g 0       Allergies   Allergen Reactions     Amlodipine      Other reaction(s): Edema,generalized     Lisinopril Cough        Social  "History     Tobacco Use     Smoking status: Never     Smokeless tobacco: Never   Substance Use Topics     Alcohol use: Yes     Comment: One drink 1-2 times a month     Family History   Problem Relation Age of Onset     Brain Cancer Mother      Other Cancer Mother         Glioblastoma     Anxiety Disorder Mother      Thyroid Disease Mother      Depression Mother      Alzheimer Disease Father      Diabetes Father      Hypertension Father      Prostate Cancer Father      Mental Illness Father         Bipolar     Obesity Father      Depression Father      Bipolar Disorder Father      Skin Cancer Father      Dementia Father      Kidney Disease Father      History   Drug Use No         Objective    /76 (BP Location: Right arm, Patient Position: Sitting, Cuff Size: Adult Large)   Pulse 69   Temp 97.2  F (36.2  C) (Temporal)   Resp 18   Ht 1.745 m (5' 8.7\")   Wt 112.9 kg (248 lb 12.8 oz)   LMP  (LMP Unknown)   SpO2 96%   BMI 37.06 kg/m      Physical Exam  /76 (BP Location: Right arm, Patient Position: Sitting, Cuff Size: Adult Large)   Pulse 69   Temp 97.2  F (36.2  C) (Temporal)   Resp 18   Ht 1.745 m (5' 8.7\")   Wt 112.9 kg (248 lb 12.8 oz)   LMP  (LMP Unknown)   SpO2 96%   BMI 37.06 kg/m       GENERAL APPEARANCE: healthy, alert and no distress     EYES: EOMI     HENT: ear canals and TM's normal      NECK: no adenopathy, no asymmetry, masses, or scars and thyroid normal to palpation     RESP: lungs clear to auscultation - no rales, rhonchi or wheezes     CV: regular rates and rhythm, normal S1 S2     ABDOMEN:  soft, nontender, no HSM or masses and bowel sounds normal     SKIN: no suspicious lesions or rashes     NEURO: Normal strength and tone, sensory exam grossly normal, mentation intact and speech normal     PSYCH: mentation appears normal. and affect normal     LYMPHATICS: No cervical adenopathy    Recent Labs   Lab Test 04/26/22  0806   HGB 10.6*         POTASSIUM 4.1   CR " 0.93        Diagnostics:  Recent Results (from the past 168 hour(s))   CBC with platelets    Collection Time: 09/08/23 12:23 PM   Result Value Ref Range    WBC Count 5.0 4.0 - 11.0 10e3/uL    RBC Count 4.83 3.80 - 5.20 10e6/uL    Hemoglobin 12.2 11.7 - 15.7 g/dL    Hematocrit 36.6 35.0 - 47.0 %    MCV 76 (L) 78 - 100 fL    MCH 25.3 (L) 26.5 - 33.0 pg    MCHC 33.3 31.5 - 36.5 g/dL    RDW 16.5 (H) 10.0 - 15.0 %    Platelet Count 150 150 - 450 10e3/uL   Basic metabolic panel  (Ca, Cl, CO2, Creat, Gluc, K, Na, BUN)    Collection Time: 09/08/23 12:23 PM   Result Value Ref Range    Sodium 139 136 - 145 mmol/L    Potassium 4.5 3.4 - 5.3 mmol/L    Chloride 104 98 - 107 mmol/L    Carbon Dioxide (CO2) 25 22 - 29 mmol/L    Anion Gap 10 7 - 15 mmol/L    Urea Nitrogen 14.1 8.0 - 23.0 mg/dL    Creatinine 0.92 0.51 - 0.95 mg/dL    Calcium 9.3 8.8 - 10.2 mg/dL    Glucose 98 70 - 99 mg/dL    GFR Estimate 70 >60 mL/min/1.73m2      EKG: sinus bradycardia, normal axis, normal intervals, no acute ST/T changes c/w ischemia, no LVH by voltage criteria    Revised Cardiac Risk Index (RCRI):  The patient has the following serious cardiovascular risks for perioperative complications:   - No serious cardiac risks = 0 points     RCRI Interpretation: 0 points: Class I (very low risk - 0.4% complication rate)         Signed Electronically by: Hannah Guillen MD  Copy of this evaluation report is provided to requesting physician.    {Provider Resources  [facility]  Revised Cardiac Risk Index :853867}Answers submitted by the patient for this visit:  Patient Health Questionnaire (Submitted on 9/8/2023)  If you checked off any problems, how difficult have these problems made it for you to do your work, take care of things at home, or get along with other people?: Somewhat difficult  PHQ9 TOTAL SCORE: 3      Sincerely,        Hannah Guillen MD

## 2023-09-08 NOTE — LETTER
[facility]  09/08/23  Patient: Gracie Parra  YOB: 1960  Medical Record Number: [id number]                                                                                  Non-Opioid Controlled Substance Agreement    This is an agreement between you and your provider regarding safe and appropriate use of controlled substances prescribed by your care team. Controlled substances are?medicines that can cause physical and mental dependence (abuse).     There are strict laws about having and using these medicines. We here at [facility] are  committed to working with you in your efforts to get better. To support you in this work, we'll help you schedule regular office appointments for medicine refills. If we must cancel or change your appointment for any reason, we'll make sure you have enough medicine to last until your next appointment.     As a Provider, I will:   Listen carefully to your concerns while treating you with respect.   Recommend a treatment plan that I believe is in your best interest and may involve therapies other than medicine.    Talk with you often about the possible benefits and the risk of harm of any medicine that we prescribe for you.  Assess the safety of this medicine and check how well it works.    Provide a plan on how to taper (discontinue or go off) using this medicine if the decision is made to stop its use.      ::  As a Patient, I understand controlled substances:     Are prescribed by my care provider to help me function or work and manage my condition(s).?  Are strong medicines and can cause serious side effects.     Need to be taken exactly as prescribed.?Combining controlled substances with certain medicines or chemicals (such as illegal drugs, alcohol, sedatives, sleeping pills, and benzodiazepines) can be dangerous or even fatal.? If I stop taking my medicines suddenly, I may have severe withdrawal symptoms.     The risks,  benefits, and side effects of these medicine(s) were explained to me. I agree that:    I will take part in other treatments as advised by my care team. This may be psychiatry or counseling, physical therapy, behavioral therapy, group treatment or a referral to specialist.    I will keep all my appointments and understand this is part of the monitoring of controlled substances.?My care team may require an office visit for EVERY controlled substance refill. If I miss appointments or don t follow instructions, my care team may stop my medicine    I will take my medicines as prescribed. I will not change the dose or schedule unless my care team tells me to. There will be no refills if I run out early.      I may be asked to come to the clinic and complete a urine drug test or complete a pill count. If I don t give a urine sample or participate in a pill count, the care team may stop my medicine.    I will only receive controlled substance prescriptions from this clinic. If I am treated by another provider, I will tell them that I am taking controlled substances and that I have a treatment agreement with this provider. I will inform my [facility] care team within one business day if I am given a prescription for any controlled substance by another healthcare provider. My [facility] care team can contact other providers and pharmacists about my use of any medicines.    It is up to me to make sure that I don't run out of my medicines on weekends or holidays.?If my care team is willing to refill my prescription without a visit, I must request refills only during office hours. Refills may take up to 3 business days to process. I will use one pharmacy to fill all my controlled substance prescriptions. I will notify the clinic about any changes to my insurance or medicine availability.    I am responsible for my prescriptions. If the medicine/prescription is lost, stolen or destroyed, it will not be replaced.?I  also agree not to share controlled substance medicines with anyone.     I am aware I should not use any illegal or recreational drugs. I agree not to drink alcohol unless my care team says I can.     If I enroll in the [place] [facility] Cannabis program, I will tell my care team before my next refill.    I will tell my care team right away if I become pregnant, have a new medical problem treated outside of my regular clinic, or have a change in my medicines.     I understand that this medicine can affect my thinking, judgment and reaction time.? Alcohol and drugs affect the brain and body, which can affect the safety of my driving. Being under the influence of alcohol or drugs can affect my decision-making, behaviors, personal safety and the safety of others. Driving while impaired (DWI) can occur if a person is driving, operating or in physical control of a car, motorcycle, boat, snowmobile, ATV, motorbike, off-road vehicle or any other motor vehicle ([place] Statute 169A.20). I understand the risk if I choose to drive or operate any vehicle or machinery.    I understand that if I do not follow any of the conditions above, my prescriptions or treatment may be stopped or changed.   I agree that my provider, clinic care team and pharmacy may work with any city, state or federal law enforcement agency that investigates the misuse, sale or other diversion of my controlled medicine. I will allow my provider to discuss my care with, or share a copy of, this agreement with any other treating provider, pharmacy or emergency room where I receive care.     I have read this agreement and have asked questions about anything I did not understand.    ________________________________________________________  Patient Signature - Gracie Parra     ___________________                   Date     ________________________________________________________  Provider Signature - Hannah Guillen MD       ___________________                    Date     ________________________________________________________  Witness Signature (required if provider not present while patient signing)          ___________________                   Date

## 2023-09-18 ENCOUNTER — TRANSFERRED RECORDS (OUTPATIENT)
Dept: HEALTH INFORMATION MANAGEMENT | Facility: CLINIC | Age: 63
End: 2023-09-18
Payer: COMMERCIAL

## 2023-09-25 ENCOUNTER — TRANSFERRED RECORDS (OUTPATIENT)
Dept: HEALTH INFORMATION MANAGEMENT | Facility: CLINIC | Age: 63
End: 2023-09-25
Payer: COMMERCIAL

## 2023-10-03 ENCOUNTER — TRANSFERRED RECORDS (OUTPATIENT)
Dept: HEALTH INFORMATION MANAGEMENT | Facility: CLINIC | Age: 63
End: 2023-10-03
Payer: COMMERCIAL

## 2023-10-31 ENCOUNTER — TRANSFERRED RECORDS (OUTPATIENT)
Dept: HEALTH INFORMATION MANAGEMENT | Facility: CLINIC | Age: 63
End: 2023-10-31
Payer: COMMERCIAL

## 2023-12-11 ENCOUNTER — NURSE TRIAGE (OUTPATIENT)
Dept: FAMILY MEDICINE | Facility: CLINIC | Age: 63
End: 2023-12-11
Payer: COMMERCIAL

## 2023-12-11 NOTE — TELEPHONE ENCOUNTER
Nurse Triage SBAR    Is this a 2nd Level Triage? YES, LICENSED PRACTITIONER REVIEW IS REQUIRED    Abdominal cramping and loose stool after eating x 12 days  -stool changing color and consistency recently, floats and has a small amount of bright red blood mixed in  -stool 1x per day or every other day d/t decreased food intake and bland diet  -primarily sticking to BRAT diet as most other foods causing sx, particularly fatty foods  -patient feels this is manageable with bland diet, but concerned as ongoing for nearly 2 weeks    Recommended disposition: recommended disposition is ED. Relayed to pt but she feels this is relatively well-managed and would like to know if she can be seen at [facility] tomorrow morning for this/if an office visit would be appropriate? Advisement appreciated.      Reason for Disposition   Blood in bowel movements  (Exception: Blood on surface of BM with constipation.)    Additional Information   Negative: Passed out (i.e., fainted, collapsed and was not responding)   Negative: Shock suspected (e.g., cold/pale/clammy skin, too weak to stand, low BP, rapid pulse)   Negative: Sounds like a life-threatening emergency to the triager   Negative: Followed an abdomen (stomach) injury   Negative: Chest pain   Negative: Abdominal pain and pregnant < 20 weeks   Negative: Abdominal pain and pregnant 20 or more weeks   Negative: Pain is mainly in upper abdomen (if needed ask: 'is it mainly above the belly button?')   Negative: Abdomen bloating or swelling are main symptoms   Negative: SEVERE abdominal pain (e.g., excruciating)   Negative: Vomiting red blood or black (coffee ground) material    Protocols used: Abdominal Pain - Female-A-[place]

## 2023-12-12 ENCOUNTER — HOSPITAL ENCOUNTER (OUTPATIENT)
Dept: CT IMAGING | Facility: CLINIC | Age: 63
Discharge: HOME | End: 2023-12-12
Attending: PHYSICIAN ASSISTANT | Admitting: PHYSICIAN ASSISTANT
Payer: COMMERCIAL

## 2023-12-12 ENCOUNTER — OFFICE VISIT (OUTPATIENT)
Dept: PEDIATRICS | Facility: CLINIC | Age: 63
End: 2023-12-12
Payer: COMMERCIAL

## 2023-12-12 VITALS
BODY MASS INDEX: 35.6 KG/M2 | WEIGHT: 239 LBS | DIASTOLIC BLOOD PRESSURE: 86 MMHG | SYSTOLIC BLOOD PRESSURE: 144 MMHG | OXYGEN SATURATION: 98 % | TEMPERATURE: 98.1 F | HEART RATE: 71 BPM | RESPIRATION RATE: 18 BRPM

## 2023-12-12 DIAGNOSIS — R16.1 SPLENOMEGALY: ICD-10-CM

## 2023-12-12 DIAGNOSIS — R11.0 NAUSEA: ICD-10-CM

## 2023-12-12 DIAGNOSIS — K92.1 BLOOD IN STOOL: ICD-10-CM

## 2023-12-12 DIAGNOSIS — R10.84 ABDOMINAL PAIN, GENERALIZED: ICD-10-CM

## 2023-12-12 DIAGNOSIS — R10.84 ABDOMINAL PAIN, GENERALIZED: Primary | ICD-10-CM

## 2023-12-12 LAB
ALBUMIN SERPL BCG-MCNC: 4.6 G/DL (ref 3.5–5.2)
ALBUMIN UR-MCNC: NEGATIVE MG/DL
ALP SERPL-CCNC: 74 U/L (ref 40–150)
ALT SERPL W P-5'-P-CCNC: 12 U/L (ref 0–50)
AMYLASE SERPL-CCNC: 25 U/L (ref 28–100)
ANION GAP SERPL CALCULATED.3IONS-SCNC: 10 MMOL/L (ref 7–15)
APPEARANCE UR: CLEAR
AST SERPL W P-5'-P-CCNC: 19 U/L (ref 0–45)
BASOPHILS # BLD AUTO: 0 10E3/UL (ref 0–0.2)
BASOPHILS NFR BLD AUTO: 1 %
BILIRUB SERPL-MCNC: 1 MG/DL
BILIRUB UR QL STRIP: NEGATIVE
BUN SERPL-MCNC: 9.5 MG/DL (ref 8–23)
CALCIUM SERPL-MCNC: 8.7 MG/DL (ref 8.8–10.2)
CHLORIDE SERPL-SCNC: 104 MMOL/L (ref 98–107)
COLOR UR AUTO: ABNORMAL
CREAT SERPL-MCNC: 0.86 MG/DL (ref 0.51–0.95)
CRP SERPL-MCNC: <3 MG/L
DEPRECATED HCO3 PLAS-SCNC: 25 MMOL/L (ref 22–29)
EGFRCR SERPLBLD CKD-EPI 2021: 76 ML/MIN/1.73M2
EOSINOPHIL # BLD AUTO: 0.1 10E3/UL (ref 0–0.7)
EOSINOPHIL NFR BLD AUTO: 2 %
ERYTHROCYTE [DISTWIDTH] IN BLOOD BY AUTOMATED COUNT: 14.4 % (ref 10–15)
GLUCOSE SERPL-MCNC: 101 MG/DL (ref 70–99)
GLUCOSE UR STRIP-MCNC: NEGATIVE MG/DL
HCT VFR BLD AUTO: 37.9 % (ref 35–47)
HGB BLD-MCNC: 13.1 G/DL (ref 11.7–15.7)
HGB UR QL STRIP: NEGATIVE
IMM GRANULOCYTES # BLD: 0 10E3/UL
IMM GRANULOCYTES NFR BLD: 0 %
KETONES UR STRIP-MCNC: NEGATIVE MG/DL
LEUKOCYTE ESTERASE UR QL STRIP: NEGATIVE
LIPASE SERPL-CCNC: 29 U/L (ref 13–60)
LYMPHOCYTES # BLD AUTO: 1.4 10E3/UL (ref 0.8–5.3)
LYMPHOCYTES NFR BLD AUTO: 37 %
MCH RBC QN AUTO: 28.1 PG (ref 26.5–33)
MCHC RBC AUTO-ENTMCNC: 34.6 G/DL (ref 31.5–36.5)
MCV RBC AUTO: 81 FL (ref 78–100)
MONOCYTES # BLD AUTO: 0.3 10E3/UL (ref 0–1.3)
MONOCYTES NFR BLD AUTO: 7 %
MUCOUS THREADS #/AREA URNS LPF: PRESENT /LPF
NEUTROPHILS # BLD AUTO: 2 10E3/UL (ref 1.6–8.3)
NEUTROPHILS NFR BLD AUTO: 53 %
NITRATE UR QL: NEGATIVE
NRBC # BLD AUTO: 0 10E3/UL
NRBC BLD AUTO-RTO: 0 /100
PH UR STRIP: 5.5 [PH] (ref 5–7)
PLATELET # BLD AUTO: 124 10E3/UL (ref 150–450)
POTASSIUM SERPL-SCNC: 4.1 MMOL/L (ref 3.4–5.3)
PROT SERPL-MCNC: 6.9 G/DL (ref 6.4–8.3)
RBC # BLD AUTO: 4.66 10E6/UL (ref 3.8–5.2)
RBC URINE: <1 /HPF
SODIUM SERPL-SCNC: 139 MMOL/L (ref 135–145)
SP GR UR STRIP: 1.03 (ref 1–1.03)
SQUAMOUS EPITHELIAL: <1 /HPF
UROBILINOGEN UR STRIP-MCNC: NORMAL MG/DL
WBC # BLD AUTO: 3.8 10E3/UL (ref 4–11)
WBC URINE: 1 /HPF

## 2023-12-12 PROCEDURE — 81001 URINALYSIS AUTO W/SCOPE: CPT | Performed by: PHYSICIAN ASSISTANT

## 2023-12-12 PROCEDURE — 80053 COMPREHEN METABOLIC PANEL: CPT | Performed by: PHYSICIAN ASSISTANT

## 2023-12-12 PROCEDURE — 86140 C-REACTIVE PROTEIN: CPT | Performed by: PHYSICIAN ASSISTANT

## 2023-12-12 PROCEDURE — 85025 COMPLETE CBC W/AUTO DIFF WBC: CPT | Performed by: PHYSICIAN ASSISTANT

## 2023-12-12 PROCEDURE — 99214 OFFICE O/P EST MOD 30 MIN: CPT | Performed by: PHYSICIAN ASSISTANT

## 2023-12-12 PROCEDURE — 82150 ASSAY OF AMYLASE: CPT | Performed by: PHYSICIAN ASSISTANT

## 2023-12-12 PROCEDURE — 83690 ASSAY OF LIPASE: CPT | Performed by: PHYSICIAN ASSISTANT

## 2023-12-12 PROCEDURE — 250N000011 HC RX IP 250 OP 636: Performed by: PHYSICIAN ASSISTANT

## 2023-12-12 PROCEDURE — 74177 CT ABD & PELVIS W/CONTRAST: CPT

## 2023-12-12 PROCEDURE — 250N000009 HC RX 250: Performed by: PHYSICIAN ASSISTANT

## 2023-12-12 PROCEDURE — 36415 COLL VENOUS BLD VENIPUNCTURE: CPT | Performed by: PHYSICIAN ASSISTANT

## 2023-12-12 RX ORDER — IOPAMIDOL 755 MG/ML
500 INJECTION, SOLUTION INTRAVASCULAR ONCE
Status: COMPLETED | OUTPATIENT
Start: 2023-12-12 | End: 2023-12-12

## 2023-12-12 RX ORDER — ONDANSETRON 4 MG/1
4 TABLET, ORALLY DISINTEGRATING ORAL EVERY 8 HOURS PRN
Qty: 15 TABLET | Refills: 0 | Status: SHIPPED | OUTPATIENT
Start: 2023-12-12 | End: 2024-04-26

## 2023-12-12 RX ORDER — DICYCLOMINE HCL 20 MG
20 TABLET ORAL EVERY 6 HOURS
Qty: 20 TABLET | Refills: 0 | Status: SHIPPED | OUTPATIENT
Start: 2023-12-12 | End: 2024-06-28

## 2023-12-12 RX ADMIN — IOPAMIDOL 100 ML: 755 INJECTION, SOLUTION INTRAVENOUS at 11:45

## 2023-12-12 RX ADMIN — SODIUM CHLORIDE 65 ML: 9 INJECTION, SOLUTION INTRAVENOUS at 11:45

## 2023-12-12 SDOH — HEALTH STABILITY: PHYSICAL HEALTH: ON AVERAGE, HOW MANY MINUTES DO YOU ENGAGE IN EXERCISE AT THIS LEVEL?: 20 MIN

## 2023-12-12 SDOH — HEALTH STABILITY: PHYSICAL HEALTH: ON AVERAGE, HOW MANY DAYS PER WEEK DO YOU ENGAGE IN MODERATE TO STRENUOUS EXERCISE (LIKE A BRISK WALK)?: 1 DAY

## 2023-12-12 ASSESSMENT — LIFESTYLE VARIABLES
SKIP TO QUESTIONS 9-10: 1
HOW OFTEN DO YOU HAVE SIX OR MORE DRINKS ON ONE OCCASION: NEVER
HOW MANY STANDARD DRINKS CONTAINING ALCOHOL DO YOU HAVE ON A TYPICAL DAY: 1 OR 2
AUDIT-C TOTAL SCORE: 1
HOW OFTEN DO YOU HAVE A DRINK CONTAINING ALCOHOL: MONTHLY OR LESS

## 2023-12-12 ASSESSMENT — SOCIAL DETERMINANTS OF HEALTH (SDOH)
DO YOU BELONG TO ANY CLUBS OR ORGANIZATIONS SUCH AS CHURCH GROUPS UNIONS, FRATERNAL OR ATHLETIC GROUPS, OR SCHOOL GROUPS?: YES
IN A TYPICAL WEEK, HOW MANY TIMES DO YOU TALK ON THE PHONE WITH FAMILY, FRIENDS, OR NEIGHBORS?: MORE THAN THREE TIMES A WEEK
HOW OFTEN DO YOU GET TOGETHER WITH FRIENDS OR RELATIVES?: THREE TIMES A WEEK
HOW OFTEN DO YOU ATTEND CHURCH OR RELIGIOUS SERVICES?: NEVER
HOW OFTEN DO YOU ATTENT MEETINGS OF THE CLUB OR ORGANIZATION YOU BELONG TO?: MORE THAN 4 TIMES PER YEAR

## 2023-12-12 NOTE — TELEPHONE ENCOUNTER
RN can inquire with [facility] to see if able to take pt. Pt can also get seen in [facility].    Wilber Chapin, DO

## 2023-12-12 NOTE — TELEPHONE ENCOUNTER
Called [facility], spoke with Lee Covarrubias PA-C who said they can accept her around 10:15 AM.     Called patient and gave her address (she wanted it sent via [employer] as she was not near a pen/paper-- sent.)    She will be there by 10:15 AM.    ORQUIDEA Conner RN  [facility]

## 2023-12-12 NOTE — PROGRESS NOTES
"  Assessment & Plan     Abdominal pain, generalized    No specific findings on blood work or CT scan     Amylase, lipase neg for pancreatitis  CMP neg for liver or renal involvement  CBC is low, likely related to viral infection  CRP normal  UA normal    Will start patient on Bentyl for abdominal cramps  Increase oral fluids  The 'BRAT' diet is suggested, then progress to diet as tolerated as symptoms mildred. Call if bloody stools, persistent diarrhea, vomiting, fever or abdominal pain.    If symptoms persist then GI follow up, referral given    - dicyclomine (BENTYL) 20 MG tablet  Dispense: 20 tablet; Refill: 0  - Adult GI  Referral - Consult Only    Blood in stool    Due to intermittent blood in stool  Collect stool cultures and return to clinic  Clear fluids    Referral to GI for any ongoing symptoms    - C. difficile Toxin B PCR with reflex to C. difficile Antigen and Toxins A/B EIA  - Enteric Bacteria and Virus Panel by MINDY Stool    - Adult GI  Referral - Consult Only    Nausea    Zofran prn nausea  - ondansetron (ZOFRAN ODT) 4 MG ODT tab  Dispense: 15 tablet; Refill: 0    Splenomegaly    Incidental findings on CT scan      At today's visit with Gracie Parra , we discussed results, diagnosis, medications and formulated a plan.  We also discussed red flags for immediate return to clinic/ER, as well as indications for follow up with PCP if not improved in 3 days. Patient understood and agreed to plan. Gracie Parra was discharged with stable vitals and has no further questions.     Review of external notes as documented elsewhere in note      BMI:   Estimated body mass index is 35.6 kg/m  as calculated from the following:    Height as of 9/8/23: 1.745 m (5' 8.7\").    Weight as of this encounter: 108.4 kg (239 lb).       CONSULTATION/REFERRAL to GI    No follow-ups on file.    Lee Covarrubias, [facility], PA-C  [facility]    Buddy Bowman is a 63 year " old, presenting for the following health issues:  Abdominal Pain (Generalized abdominal pain X 12 days)      HPI     Abdominal/Flank Pain  Onset/Duration: X 12 days  Description:   Character: Cramping  Location: Generalized abdominal pain  Radiation: None  Intensity: 1/10  Progression of Symptoms:  intermittent  Accompanying Signs & Symptoms:  Fever/chills: no   Gas/Bloating: no   Nausea: YES  Vomitting: no   Diarrhea: YES  Constipation:no   Dysuria: no            Hematuria: no            Frequency: no            Incontinence of urine: YES- chronic, not a new sx  History:            Last bowel movement: three days ago  Trauma: no   Previous similar pain: no    Previous tests done: none           Previous Abdominal surgery: YES- hysterectomy  Precipitating factors:   Does the pain change with:     Food: YES- increased pain, cramping, loose stools     Bowel Movement: YES- improved pain    Urination: no              Other factors: YES- stool orange, floating, bright red blood in stool only 12/9/2023  Therapies tried and outcome:   Limiting diet, BRAT diet-this seems to prevent cramping and diarrhea    When food last eaten: Banana and yogurt 10:00 AM        Review of Systems   Constitutional, HEENT, cardiovascular, pulmonary, GI, , musculoskeletal, neuro, skin, endocrine and psych systems are negative, except as otherwise noted.      Objective    Wt 108.4 kg (239 lb)   LMP  (LMP Unknown)   BMI 35.60 kg/m    Body mass index is 35.6 kg/m .  Physical Exam   GENERAL: healthy, alert and no distress  EYES: Eyes grossly normal to inspection, PERRL and conjunctivae and sclerae normal  HENT: ear canals and TM's normal, nose and mouth without ulcers or lesions  NECK: no adenopathy, no asymmetry, masses, or scars and thyroid normal to palpation  RESP: lungs clear to auscultation - no rales, rhonchi or wheezes  CV: regular rate and rhythm, normal S1 S2, no S3 or S4, no murmur, click or rub, no peripheral edema and peripheral  pulses strong  ABDOMEN: tenderness LUQ, no organomegaly or masses, and bowel sounds normal  MS: no gross musculoskeletal defects noted, no edema  SKIN: no suspicious lesions or rashes  NEURO: Normal strength and tone, mentation intact and speech normal  PSYCH: mentation appears normal, affect normal/bright        Results for orders placed or performed during the hospital encounter of 12/12/23   CT Abdomen Pelvis w Contrast     Status: None (Preliminary result)    Narrative    CT ABDOMEN AND PELVIS WITH CONTRAST 12/12/2023 11:51 AM    CLINICAL HISTORY: Abdominal pain, generalized; Blood in stool.    TECHNIQUE: CT scan of the abdomen and pelvis was performed following  injection of IV contrast. Multiplanar reformats were obtained. Dose  reduction techniques were used.    CONTRAST: 100mL Isovue-370    COMPARISON: None.    FINDINGS:   LOWER CHEST: No infiltrates or effusions.    HEPATOBILIARY: Normal contour with no significant mass. No bile duct  dilatation. Calcified gallstones.    PANCREAS: No significant mass, duct dilatation, or inflammatory  change.    SPLEEN: Enlarged at 14.9 cm.    ADRENAL GLANDS: No significant nodules.    KIDNEYS/BLADDER: No significant mass, stones, or hydronephrosis.    BOWEL: No obstruction or inflammatory change.    PELVIC ORGANS: No pelvic masses.    ADDITIONAL FINDINGS: No adenopathy or free fluid.    MUSCULOSKELETAL: No frankly destructive bony lesions.      Impression    IMPRESSION:   1.  Cholelithiasis without cholecystitis.  2.  Nonspecific splenomegaly at 14.9 cm.   Results for orders placed or performed in visit on 12/12/23   Amylase     Status: Abnormal   Result Value Ref Range    Amylase 25 (L) 28 - 100 U/L   Comprehensive metabolic panel     Status: Abnormal   Result Value Ref Range    Sodium 139 135 - 145 mmol/L    Potassium 4.1 3.4 - 5.3 mmol/L    Carbon Dioxide (CO2) 25 22 - 29 mmol/L    Anion Gap 10 7 - 15 mmol/L    Urea Nitrogen 9.5 8.0 - 23.0 mg/dL    Creatinine 0.86 0.51  - 0.95 mg/dL    GFR Estimate 76 >60 mL/min/1.73m2    Calcium 8.7 (L) 8.8 - 10.2 mg/dL    Chloride 104 98 - 107 mmol/L    Glucose 101 (H) 70 - 99 mg/dL    Alkaline Phosphatase 74 40 - 150 U/L    AST 19 0 - 45 U/L    ALT 12 0 - 50 U/L    Protein Total 6.9 6.4 - 8.3 g/dL    Albumin 4.6 3.5 - 5.2 g/dL    Bilirubin Total 1.0 <=1.2 mg/dL   CRP inflammation     Status: Normal   Result Value Ref Range    CRP Inflammation <3.00 <5.00 mg/L   Lipase     Status: Normal   Result Value Ref Range    Lipase 29 13 - 60 U/L   CBC with platelets and differential     Status: Abnormal   Result Value Ref Range    WBC Count 3.8 (L) 4.0 - 11.0 10e3/uL    RBC Count 4.66 3.80 - 5.20 10e6/uL    Hemoglobin 13.1 11.7 - 15.7 g/dL    Hematocrit 37.9 35.0 - 47.0 %    MCV 81 78 - 100 fL    MCH 28.1 26.5 - 33.0 pg    MCHC 34.6 31.5 - 36.5 g/dL    RDW 14.4 10.0 - 15.0 %    Platelet Count 124 (L) 150 - 450 10e3/uL    % Neutrophils 53 %    % Lymphocytes 37 %    % Monocytes 7 %    % Eosinophils 2 %    % Basophils 1 %    % Immature Granulocytes 0 %    NRBCs per 100 WBC 0 <1 /100    Absolute Neutrophils 2.0 1.6 - 8.3 10e3/uL    Absolute Lymphocytes 1.4 0.8 - 5.3 10e3/uL    Absolute Monocytes 0.3 0.0 - 1.3 10e3/uL    Absolute Eosinophils 0.1 0.0 - 0.7 10e3/uL    Absolute Basophils 0.0 0.0 - 0.2 10e3/uL    Absolute Immature Granulocytes 0.0 <=0.4 10e3/uL    Absolute NRBCs 0.0 10e3/uL   UA with Microscopic reflex to Culture     Status: Abnormal    Specimen: Urine, Clean Catch   Result Value Ref Range    Color Urine Light Yellow Colorless, Straw, Light Yellow, Yellow    Appearance Urine Clear Clear    Glucose Urine Negative Negative mg/dL    Bilirubin Urine Negative Negative    Ketones Urine Negative Negative mg/dL    Specific Gravity Urine 1.027 1.003 - 1.035    Blood Urine Negative Negative    pH Urine 5.5 5.0 - 7.0    Protein Albumin Urine Negative Negative mg/dL    Urobilinogen Urine Normal Normal, 2.0 mg/dL    Nitrite Urine Negative Negative    Leukocyte  Esterase Urine Negative Negative    Mucus Urine Present (A) None Seen /LPF    RBC Urine <1 <=2 /HPF    WBC Urine 1 <=5 /HPF    Squamous Epithelials Urine <1 <=1 /HPF    Narrative    Urine Culture not indicated   CBC with platelets differential     Status: Abnormal    Narrative    The following orders were created for panel order CBC with platelets differential.  Procedure                               Abnormality         Status                     ---------                               -----------         ------                     CBC with platelets and d...[[id number]]  Abnormal            Final result                 Please view results for these tests on the individual orders.

## 2024-02-07 ENCOUNTER — LAB (OUTPATIENT)
Dept: LAB | Facility: CLINIC | Age: 64
End: 2024-02-07
Payer: COMMERCIAL

## 2024-02-07 DIAGNOSIS — K92.1 BLOOD IN STOOL: ICD-10-CM

## 2024-02-07 PROCEDURE — 87507 IADNA-DNA/RNA PROBE TQ 12-25: CPT

## 2024-02-07 PROCEDURE — 87493 C DIFF AMPLIFIED PROBE: CPT | Mod: 59

## 2024-02-08 ENCOUNTER — OFFICE VISIT (OUTPATIENT)
Dept: URGENT CARE | Facility: URGENT CARE | Age: 64
End: 2024-02-08
Payer: COMMERCIAL

## 2024-02-08 VITALS
BODY MASS INDEX: 34.71 KG/M2 | DIASTOLIC BLOOD PRESSURE: 90 MMHG | WEIGHT: 229 LBS | SYSTOLIC BLOOD PRESSURE: 136 MMHG | TEMPERATURE: 98.4 F | RESPIRATION RATE: 17 BRPM | HEART RATE: 87 BPM | HEIGHT: 68 IN | OXYGEN SATURATION: 98 %

## 2024-02-08 DIAGNOSIS — J06.9 VIRAL URI: Primary | ICD-10-CM

## 2024-02-08 DIAGNOSIS — R05.1 ACUTE COUGH: ICD-10-CM

## 2024-02-08 LAB
ADV 40+41 DNA STL QL NAA+NON-PROBE: NEGATIVE
ASTRO TYP 1-8 RNA STL QL NAA+NON-PROBE: NEGATIVE
C CAYETANENSIS DNA STL QL NAA+NON-PROBE: NEGATIVE
C DIFF TOX B STL QL: NEGATIVE
CAMPYLOBACTER DNA SPEC NAA+PROBE: NEGATIVE
CRYPTOSP DNA STL QL NAA+NON-PROBE: NEGATIVE
E COLI O157 DNA STL QL NAA+NON-PROBE: ABNORMAL
E HISTOLYT DNA STL QL NAA+NON-PROBE: NEGATIVE
EAEC ASTA GENE ISLT QL NAA+PROBE: NEGATIVE
EC STX1+STX2 GENES STL QL NAA+NON-PROBE: NEGATIVE
EPEC EAE GENE STL QL NAA+NON-PROBE: NEGATIVE
ETEC LTA+ST1A+ST1B TOX ST NAA+NON-PROBE: NEGATIVE
FLUAV AG SPEC QL IA: NEGATIVE
FLUBV AG SPEC QL IA: NEGATIVE
G LAMBLIA DNA STL QL NAA+NON-PROBE: NEGATIVE
NOROVIRUS GI+II RNA STL QL NAA+NON-PROBE: POSITIVE
P SHIGELLOIDES DNA STL QL NAA+NON-PROBE: NEGATIVE
RVA RNA STL QL NAA+NON-PROBE: NEGATIVE
SALMONELLA SP RPOD STL QL NAA+PROBE: NEGATIVE
SAPO I+II+IV+V RNA STL QL NAA+NON-PROBE: NEGATIVE
SHIGELLA SP+EIEC IPAH ST NAA+NON-PROBE: NEGATIVE
V CHOLERAE DNA SPEC QL NAA+PROBE: NEGATIVE
VIBRIO DNA SPEC NAA+PROBE: NEGATIVE
Y ENTEROCOL DNA STL QL NAA+PROBE: NEGATIVE

## 2024-02-08 PROCEDURE — 87635 SARS-COV-2 COVID-19 AMP PRB: CPT

## 2024-02-08 PROCEDURE — 87804 INFLUENZA ASSAY W/OPTIC: CPT

## 2024-02-08 PROCEDURE — 99213 OFFICE O/P EST LOW 20 MIN: CPT

## 2024-02-08 ASSESSMENT — ENCOUNTER SYMPTOMS
CHILLS: 1
FATIGUE: 1
APPETITE CHANGE: 1
SINUS PAIN: 0
COUGH: 1
SORE THROAT: 0
NEUROLOGICAL NEGATIVE: 1
CARDIOVASCULAR NEGATIVE: 1
FEVER: 0
SINUS PRESSURE: 0
WHEEZING: 0
DIAPHORESIS: 0
SHORTNESS OF BREATH: 0
EYES NEGATIVE: 1
CHEST TIGHTNESS: 1
GASTROINTESTINAL NEGATIVE: 1

## 2024-02-08 NOTE — PATIENT INSTRUCTIONS
"Flu test is negative.    COVID test is pending. Will let you know if this results positive.    I suspect you have a viral upper respiratory infection or a \"common cold.\" Adults typically experience about 2-3 colds per year while children experience 8-12 colds per year.     Cold symptoms typically last 3-7 days. However, coughing, sneezing, and congestion can last up to 2 weeks.     Try tylenol or an NSAID such as ibuprofen, to help relieve discomforts such as headaches, ear aches, muscle pains, and/or joint pains.     Ibuprofen dosin mg to 400 mg every 4-6 hours as needed, or 600 mg to 800 mg every 6-8 hours as needed. Do not consume more than 3200 mg within a 24 hour period.     Tylenol dosin mg to 650 mg every 4-6 hours as needed, or 1000 mg every 6 hours as needed. Do not consume more than 4000 mg within a 24 hour period.     You may try an oral non-sedating antihistamine/decongestant combination as well. Non sedating antihistamines combined with a decongestant provide better symptom relief than either agent alone. However, if you have high blood pressure you should avoid decongestants.  Loratadine and pseudoephedrine (Claritin D)  Cetirizine and pseudoephedrine  (All Day Allergy D, Zyrtec D)  Fexofenadine and pseudoephedrine (Allegra D)    Remember to not use decongestant nasal sprays for more than 3 days at a time. Saline nasal sprays may also help relieve congestion. Try air humidification. Stay hydrated.     Prevent spread: practice good handwashing, use sanitizing gel, disinfect frequently used surfaces, wear a mask.    Please seek medical care if experiencing, shortness of breath, chest pain, dizziness/light headedness, persistent fevers.    "

## 2024-02-08 NOTE — PROGRESS NOTES
Patient presents with:  Cough: X7 days of cough, productive dark green mucus   Nasal Congestion: X7 days of congestion   Chills: Had chills for one day but has since subsided   Fatigue: X7 days of fatigue, worsening   Urgent Care: Loss of appetite       Clinical Decision Makin-year-old female, well, nontoxic, nonseptic appearing presenting with viral symptoms including productive cough, nasal congestion, fatigue x 7 days.  Reassuringly, without fevers.  Did have chills and diarrhea at onset of illness, but this is subsided.  Reassuringly without N/V/D.  Without shortness of breath.  Notices some chest discomfort with coughing only, denies chest pain.  Exam is reassuringly with clear lung sounds throughout. Patient is able to speak in full sentences and ambulate without becoming short of breath.  No respiratory distress.  VSS on RA.  Highly suspect viral illness. Patient agreeable to flu and COVID testing.    Flu is negative.  COVID pending.  Deferred CXR as lung sounds are clear to auscultation throughout, patient is not short of breath, patient is afebrile.  Patient agreeable with plan to defer CXR.    Highly suspect viral URI.  Discussed likely progression of illness.  Recommended to return if no improvement or worsening in the next 3 to 5 days.  Recommended avoiding over-the-counter cold/flu medicine except for Coricidin HBP, considering high blood pressure.  Patient verbalized understanding and agreeable with plan.    At the end of the encounter, I discussed results, diagnosis, medications. Discussed red flags for immediate return to clinic/ER, as well as indications for follow up if no improvement. Patient understood and agreed to plan. Patient was stable for discharge.    ICD-10-CM    1. Viral URI  J06.9       2. Acute cough  R05.1 Influenza A/B antigen     Symptomatic COVID-19 Virus (Coronavirus) by PCR Nose          Patient Instructions   Flu test is negative.    COVID test is pending. Will let you  "know if this results positive.    I suspect you have a viral upper respiratory infection or a \"common cold.\" Adults typically experience about 2-3 colds per year while children experience 8-12 colds per year.     Cold symptoms typically last 3-7 days. However, coughing, sneezing, and congestion can last up to 2 weeks.     Try tylenol or an NSAID such as ibuprofen, to help relieve discomforts such as headaches, ear aches, muscle pains, and/or joint pains.     Ibuprofen dosin mg to 400 mg every 4-6 hours as needed, or 600 mg to 800 mg every 6-8 hours as needed. Do not consume more than 3200 mg within a 24 hour period.     Tylenol dosin mg to 650 mg every 4-6 hours as needed, or 1000 mg every 6 hours as needed. Do not consume more than 4000 mg within a 24 hour period.     You may try an oral non-sedating antihistamine/decongestant combination as well. Non sedating antihistamines combined with a decongestant provide better symptom relief than either agent alone. However, if you have high blood pressure you should avoid decongestants.  Loratadine and pseudoephedrine (Claritin D)  Cetirizine and pseudoephedrine  (All Day Allergy D, Zyrtec D)  Fexofenadine and pseudoephedrine (Allegra D)    Remember to not use decongestant nasal sprays for more than 3 days at a time. Saline nasal sprays may also help relieve congestion. Try air humidification. Stay hydrated.     Prevent spread: practice good handwashing, use sanitizing gel, disinfect frequently used surfaces, wear a mask.    Please seek medical care if experiencing, shortness of breath, chest pain, dizziness/light headedness, persistent fevers.      HPI:  Gracie Parra is a 63 year old female history of hypertension who presents today with  Cough - green sputum. No SOB.   Chest discomfort with coughing only. No wheezing.  Nasal congestion x 7 days.   Chills this past Tuesday but none since. No fevers.   Fatigue.   Sore throat initially, but this has " "subsided.   Lack of appetite. No N/V.  Diarrhea x 1 day- took imodium with relief.   No abd pain.     Home COVID test negative.     Grand daughters sick.     History obtained from the patient.    Problem List:  2017-07: Malignant neoplasm of uterus (H)  2017-06: Adjustment disorder with mixed anxiety and depressed mood  2017-06: Post-concussion headache  2012-08: Vitamin D deficiency  2012-06: Major depressive disorder, recurrent (H24)  2012-06: Anxiety state  2012-06: Eating disorder  2012-03: HTN (hypertension)  2010-06: Depression with anxiety  2007-05: Other atopic dermatitis and related conditions  2007-04: Attention deficit hyperactivity disorder (ADHD)      Past Medical History:   Diagnosis Date    ADHD     Arthritis 25+ year    Asthma     Cancer (H) 4 yrs ago    Uterine cancer    Depression     Depressive disorder Since childhood    In remission    Hypertension     Psoriasis        Social History     Tobacco Use    Smoking status: Never    Smokeless tobacco: Never   Substance Use Topics    Alcohol use: Yes     Comment: One drink 1-2 times a month       Review of Systems   Constitutional:  Positive for appetite change, chills and fatigue. Negative for diaphoresis and fever.   HENT:  Positive for congestion. Negative for sinus pressure, sinus pain and sore throat.    Eyes: Negative.    Respiratory:  Positive for cough and chest tightness. Negative for shortness of breath and wheezing.    Cardiovascular: Negative.    Gastrointestinal: Negative.    Neurological: Negative.        Vitals:    02/08/24 1419 02/08/24 1424   BP: (!) 151/93 (!) 136/90   Pulse: 87    Resp: 17    Temp: 98.4  F (36.9  C)    TempSrc: Oral    SpO2: 98%    Weight: 103.9 kg (229 lb)    Height: 1.727 m (5' 8\")        Physical Exam  Constitutional:       General: She is not in acute distress.     Appearance: Normal appearance. She is not ill-appearing, toxic-appearing or diaphoretic.   HENT:      Head: Normocephalic and atraumatic.      Right " Ear: Tympanic membrane, ear canal and external ear normal. There is no impacted cerumen.      Left Ear: Tympanic membrane, ear canal and external ear normal. There is no impacted cerumen.      Nose: Congestion present.   Eyes:      General: No scleral icterus.        Right eye: No discharge.         Left eye: No discharge.      Conjunctiva/sclera: Conjunctivae normal.   Cardiovascular:      Rate and Rhythm: Normal rate and regular rhythm.      Heart sounds: Normal heart sounds. No murmur heard.     No friction rub. No gallop.   Pulmonary:      Effort: Pulmonary effort is normal. No respiratory distress.      Breath sounds: No stridor. No wheezing, rhonchi or rales.   Chest:      Chest wall: No tenderness.   Skin:     General: Skin is warm.   Neurological:      General: No focal deficit present.      Mental Status: She is alert and oriented to person, place, and time.   Psychiatric:         Mood and Affect: Mood normal.         Behavior: Behavior normal.         Thought Content: Thought content normal.         Judgment: Judgment normal.         Results:  Results for orders placed or performed in visit on 02/08/24   Influenza A/B antigen     Status: Normal    Specimen: Nose; Swab   Result Value Ref Range    Influenza A antigen Negative Negative    Influenza B antigen Negative Negative    Narrative    Test results must be correlated with clinical data. If necessary, results should be confirmed by a molecular assay or viral culture.

## 2024-02-09 ENCOUNTER — TELEPHONE (OUTPATIENT)
Dept: NURSING | Facility: CLINIC | Age: 64
End: 2024-02-09
Payer: COMMERCIAL

## 2024-02-09 LAB — SARS-COV-2 RNA RESP QL NAA+PROBE: POSITIVE

## 2024-02-09 NOTE — TELEPHONE ENCOUNTER
Pt returned call and writer relayed message. Offered paxlovid treatment but pt already passed 5 day time frame. Pt is on day 8. Offered virtual appt with a provider, pt refused.          Sonu Villela BSN RN  [facility]

## 2024-02-09 NOTE — TELEPHONE ENCOUNTER
Patient classified as COVID treatment eligible by Epic high risk algorithm:  Yes    Coronavirus (COVID-19) Notification    Reason for call  Notify of POSITIVE COVID-19 lab result, assess symptoms,  review [facility] recommendations    Lab Result   Lab test for 2019-nCoV rRt-PCR or SARS-COV-2 PCR  Oropharyngeal AND/OR nasopharyngeal swabs were POSITIVE for 2019-nCoV RNA [OR] SARS-COV-2 RNA (COVID-19) RNA     We have been unable to reach patient by phone at this time to notify of their Positive COVID-19 result.    Left voicemail message requesting a call back to [phone] [facility] for results.        A Positive COVID-19 letter will be sent via [employer] or the mail.    Radha Felipe

## 2024-02-15 ENCOUNTER — OFFICE VISIT (OUTPATIENT)
Dept: URGENT CARE | Facility: URGENT CARE | Age: 64
End: 2024-02-15
Payer: COMMERCIAL

## 2024-02-15 VITALS
DIASTOLIC BLOOD PRESSURE: 93 MMHG | WEIGHT: 225 LBS | OXYGEN SATURATION: 97 % | HEART RATE: 60 BPM | BODY MASS INDEX: 34.21 KG/M2 | TEMPERATURE: 97.2 F | SYSTOLIC BLOOD PRESSURE: 130 MMHG | RESPIRATION RATE: 18 BRPM

## 2024-02-15 DIAGNOSIS — U07.1 INFECTION DUE TO 2019 NOVEL CORONAVIRUS: Primary | ICD-10-CM

## 2024-02-15 PROCEDURE — 99213 OFFICE O/P EST LOW 20 MIN: CPT | Performed by: PHYSICIAN ASSISTANT

## 2024-02-15 NOTE — PROGRESS NOTES
SUBJECTIVE:   Grcaie Parra is a pleasant 63 year old female presenting with a chief complaint of persisting fatigue and URI symptoms 7 days post COVID positive test. Symptoms present for 3 weeks.  No worsening, just persistance.      Past Medical History:   Diagnosis Date    ADHD     Arthritis 25+ year    Asthma     Cancer (H) 4 yrs ago    Uterine cancer    Depression     Depressive disorder Since childhood    In remission    Hypertension     Psoriasis      Current Outpatient Medications   Medication Sig Dispense Refill    metoprolol succinate ER (TOPROL XL) 50 MG 24 hr tablet TAKE 1 TABLET(50 MG) BY MOUTH DAILY 90 tablet 3    albuterol (PROAIR HFA/PROVENTIL HFA/VENTOLIN HFA) 108 (90 Base) MCG/ACT inhaler Inhale 2 puffs into the lungs every 4 hours as needed for shortness of breath / dyspnea or wheezing 18 g 0    buPROPion (WELLBUTRIN XL) 300 MG 24 hr tablet Take 1 tablet (300 mg) by mouth every morning (Patient not taking: Reported on 8/27/2023) 90 tablet 3    cetirizine (ZYRTEC) 10 MG tablet Take 10 mg by mouth daily      dicyclomine (BENTYL) 20 MG tablet Take 1 tablet (20 mg) by mouth every 6 hours (Patient not taking: Reported on 2/8/2024) 20 tablet 0    Methylphenidate HCl (CONCERTA PO)  (Patient not taking: Reported on 2/8/2024)      ondansetron (ZOFRAN ODT) 4 MG ODT tab Take 1 tablet (4 mg) by mouth every 8 hours as needed for nausea (Patient not taking: Reported on 2/8/2024) 15 tablet 0    sertraline (ZOLOFT) 50 MG tablet Take 1.5 tablets (75 mg) by mouth daily (Patient not taking: Reported on 8/27/2023) 135 tablet 3     Social History     Tobacco Use    Smoking status: Never    Smokeless tobacco: Never   Substance Use Topics    Alcohol use: Yes     Comment: One drink 1-2 times a month       ROS:  Review of systems negative except as stated above.    OBJECTIVE:  BP (!) 130/93   Pulse 60   Temp 97.2  F (36.2  C) (Temporal)   Resp 18   Wt 102.1 kg (225 lb)   LMP  (LMP Unknown)   SpO2 97%    BMI 34.21 kg/m    GENERAL APPEARANCE: healthy, alert and no distress  RESP: lungs clear to auscultation - no rales, rhonchi or wheezes  CV: regular rates and rhythm, normal S1 S2, no murmur noted  NEURO: Normal strength and tone, sensory exam grossly normal,  normal speech and mentation      ASSESSMENT:  (U07.1) Infection due to 2019 novel coronavirus  (primary encounter diagnosis)  Comment: no red flags or complications at this time.  Symptoms present for nearly 3 weeks  Plan: follow up with PCP on 22 as scheduled, return with new or worsening symptoms

## 2024-02-21 ASSESSMENT — PATIENT HEALTH QUESTIONNAIRE - PHQ9
SUM OF ALL RESPONSES TO PHQ QUESTIONS 1-9: 14
10. IF YOU CHECKED OFF ANY PROBLEMS, HOW DIFFICULT HAVE THESE PROBLEMS MADE IT FOR YOU TO DO YOUR WORK, TAKE CARE OF THINGS AT HOME, OR GET ALONG WITH OTHER PEOPLE: VERY DIFFICULT
SUM OF ALL RESPONSES TO PHQ QUESTIONS 1-9: 14

## 2024-02-22 ENCOUNTER — OFFICE VISIT (OUTPATIENT)
Dept: FAMILY MEDICINE | Facility: CLINIC | Age: 64
End: 2024-02-22
Payer: COMMERCIAL

## 2024-02-22 ENCOUNTER — ANCILLARY PROCEDURE (OUTPATIENT)
Dept: GENERAL RADIOLOGY | Facility: CLINIC | Age: 64
End: 2024-02-22
Attending: FAMILY MEDICINE
Payer: COMMERCIAL

## 2024-02-22 VITALS
WEIGHT: 226 LBS | RESPIRATION RATE: 15 BRPM | SYSTOLIC BLOOD PRESSURE: 130 MMHG | HEART RATE: 60 BPM | TEMPERATURE: 96.9 F | DIASTOLIC BLOOD PRESSURE: 82 MMHG | HEIGHT: 69 IN | BODY MASS INDEX: 33.47 KG/M2 | OXYGEN SATURATION: 97 %

## 2024-02-22 DIAGNOSIS — F33.1 MODERATE EPISODE OF RECURRENT MAJOR DEPRESSIVE DISORDER (H): ICD-10-CM

## 2024-02-22 DIAGNOSIS — R05.1 ACUTE COUGH: ICD-10-CM

## 2024-02-22 PROCEDURE — 87635 SARS-COV-2 COVID-19 AMP PRB: CPT | Performed by: FAMILY MEDICINE

## 2024-02-22 PROCEDURE — 71046 X-RAY EXAM CHEST 2 VIEWS: CPT | Mod: TC | Performed by: RADIOLOGY

## 2024-02-22 PROCEDURE — 99214 OFFICE O/P EST MOD 30 MIN: CPT | Performed by: FAMILY MEDICINE

## 2024-02-22 RX ORDER — BENZONATATE 200 MG/1
200 CAPSULE ORAL 3 TIMES DAILY PRN
Qty: 30 CAPSULE | Refills: 0 | Status: SHIPPED | OUTPATIENT
Start: 2024-02-22 | End: 2024-04-26

## 2024-02-22 RX ORDER — METOPROLOL SUCCINATE 50 MG/1
50 TABLET, EXTENDED RELEASE ORAL DAILY
Qty: 90 TABLET | Refills: 3 | Status: CANCELLED | OUTPATIENT
Start: 2024-02-22

## 2024-02-22 RX ORDER — SERTRALINE HYDROCHLORIDE 25 MG/1
TABLET, FILM COATED ORAL
Qty: 132 TABLET | Refills: 0 | Status: SHIPPED | OUTPATIENT
Start: 2024-02-22 | End: 2024-04-26

## 2024-02-22 RX ORDER — CODEINE PHOSPHATE AND GUAIFENESIN 10; 100 MG/5ML; MG/5ML
1-2 SOLUTION ORAL
Qty: 180 ML | Refills: 0 | Status: SHIPPED | OUTPATIENT
Start: 2024-02-22 | End: 2024-04-26

## 2024-02-22 RX ORDER — BUPROPION HYDROCHLORIDE 300 MG/1
300 TABLET ORAL EVERY MORNING
Qty: 90 TABLET | Refills: 3 | Status: CANCELLED | OUTPATIENT
Start: 2024-02-22

## 2024-02-22 ASSESSMENT — PAIN SCALES - GENERAL: PAINLEVEL: NO PAIN (0)

## 2024-02-22 NOTE — PROGRESS NOTES
"  Assessment & Plan     Moderate episode of recurrent major depressive disorder (H)  - sertraline (ZOLOFT) 25 MG tablet; Take 1 tablet (25 mg) by mouth daily for 14 days, THEN 2 tablets (50 mg) daily for 14 days, THEN 3 tablets (75 mg) daily for 30 days.    Acute cough  - XR Chest 2 Views; Future  - Symptomatic COVID-19 Virus (Coronavirus) by PCR Nose  - benzonatate (TESSALON) 200 MG capsule; Take 1 capsule (200 mg) by mouth 3 times daily as needed for cough  - guaiFENesin-codeine (ROBITUSSIN AC) 100-10 MG/5ML solution; Take 5-10 mLs by mouth nightly as needed for cough            BMI  Estimated body mass index is 33.37 kg/m  as calculated from the following:    Height as of this encounter: 1.753 m (5' 9\").    Weight as of this encounter: 102.5 kg (226 lb).             Buddy Bowman is a 63 year old, presenting for the following health issues:  Depression (Anti depressions ) and RECHECK (Follow Up with GI )      2/22/2024     9:28 AM   Additional Questions   Roomed by Liseth Jara     History of Present Illness       Mental Health Follow-up:  Patient presents to follow-up on Depression.Patient's depression since last visit has been:  Medium  The patient is having other symptoms associated with depression.      Any significant life events: job concerns  Patient is not feeling anxious or having panic attacks.  Patient has no concerns about alcohol or drug use.    Reason for visit:  Ongoing Covid and gastrointestinal    She eats 2-3 servings of fruits and vegetables daily.She consumes 1 sweetened beverage(s) daily.She exercises with enough effort to increase her heart rate 10 to 19 minutes per day.  She exercises with enough effort to increase her heart rate 3 or less days per week.   She is taking medications regularly.       2/2/24 - covid symptoms. She is still experiencing cough, nasal congestion, lack of appetite, fatigue, brain fog, runny nose (green to clear), sore throat and left ear pain. She does have a " dry and productive cough (green). No fever, chills, sinus pressure, headaches, shortness of breath or wheezing. She is taking a cold med at night to help her sleep.     For the last three weeks she hasn't had appetite. She also has norovirus. No loose stools over the last two days.     She hasn't been on antidepressants for 6 months. Works has been stressful along with the winter which exacerbated her depression. She would like to restart zoloft.                    Objective    LMP  (LMP Unknown)   There is no height or weight on file to calculate BMI.  Physical Exam               Signed Electronically by: Hannah Guillen MD

## 2024-02-23 ENCOUNTER — TELEPHONE (OUTPATIENT)
Dept: NURSING | Facility: CLINIC | Age: 64
End: 2024-02-23
Payer: COMMERCIAL

## 2024-02-23 LAB — SARS-COV-2 RNA RESP QL NAA+PROBE: POSITIVE

## 2024-02-23 NOTE — TELEPHONE ENCOUNTER
Patient classified as COVID treatment eligible by Epic high risk algorithm:  Yes    Coronavirus (COVID-19) Notification    Reason for call  Notify of POSITIVE COVID-19 lab result, assess symptoms,  review [facility] recommendations    Lab Result   Lab test for 2019-nCoV rRt-PCR or SARS-COV-2 PCR  Oropharyngeal AND/OR nasopharyngeal swabs were POSITIVE for 2019-nCoV RNA [OR] SARS-COV-2 RNA (COVID-19) RNA     We have been unable to reach patient by phone at this time to notify of their Positive COVID-19 result.    Left voicemail message requesting a call back to [phone] [facility] for results.        A Positive COVID-19 letter will be sent via [employer] or the mail.    Annelise Simmons

## 2024-02-27 ENCOUNTER — VIRTUAL VISIT (OUTPATIENT)
Dept: URGENT CARE | Facility: CLINIC | Age: 64
End: 2024-02-27
Payer: COMMERCIAL

## 2024-02-27 ENCOUNTER — MYC MEDICAL ADVICE (OUTPATIENT)
Dept: FAMILY MEDICINE | Facility: CLINIC | Age: 64
End: 2024-02-27

## 2024-02-27 ENCOUNTER — E-VISIT (OUTPATIENT)
Dept: FAMILY MEDICINE | Facility: CLINIC | Age: 64
End: 2024-02-27
Payer: COMMERCIAL

## 2024-02-27 DIAGNOSIS — Z86.16 HISTORY OF COVID-19: Primary | ICD-10-CM

## 2024-02-27 DIAGNOSIS — U07.1 COVID: Primary | ICD-10-CM

## 2024-02-27 PROCEDURE — 99207 PR NON-BILLABLE SERV PER CHARTING: CPT | Performed by: FAMILY MEDICINE

## 2024-02-27 PROCEDURE — 99212 OFFICE O/P EST SF 10 MIN: CPT | Mod: 95 | Performed by: EMERGENCY MEDICINE

## 2024-02-27 NOTE — PROGRESS NOTES
Video visit:  Start time: 9:02 AM  Stop time: 9:10 AM  Duration: 8 minutes  Patient location: Home  Provider location: [facility] virtual provider (remote).        CHIEF COMPLAINT: Repeat COVID test requested      HPI: Patient is a 63-year-old female whose had smoldering upper respiratory symptoms for 3 weeks.  On Friday she had a positive PCR through the clinic.  She feels generally better and is requesting a repeat test.  She states she always has false negative home tests.  On the day of her positive lab test she had a negative home test.      ROS: See HPI otherwise normal.    Allergies   Allergen Reactions     Amlodipine      Other reaction(s): Edema,generalized     Lisinopril Cough      Current Outpatient Medications   Medication Sig Dispense Refill     albuterol (PROAIR HFA/PROVENTIL HFA/VENTOLIN HFA) 108 (90 Base) MCG/ACT inhaler Inhale 2 puffs into the lungs every 4 hours as needed for shortness of breath / dyspnea or wheezing 18 g 0     benzonatate (TESSALON) 200 MG capsule Take 1 capsule (200 mg) by mouth 3 times daily as needed for cough 30 capsule 0     buPROPion (WELLBUTRIN XL) 300 MG 24 hr tablet Take 1 tablet (300 mg) by mouth every morning (Patient not taking: Reported on 8/27/2023) 90 tablet 3     cetirizine (ZYRTEC) 10 MG tablet Take 10 mg by mouth daily       dicyclomine (BENTYL) 20 MG tablet Take 1 tablet (20 mg) by mouth every 6 hours (Patient not taking: Reported on 2/8/2024) 20 tablet 0     guaiFENesin-codeine (ROBITUSSIN AC) 100-10 MG/5ML solution Take 5-10 mLs by mouth nightly as needed for cough 180 mL 0     Methylphenidate HCl (CONCERTA PO)  (Patient not taking: Reported on 2/8/2024)       metoprolol succinate ER (TOPROL XL) 50 MG 24 hr tablet TAKE 1 TABLET(50 MG) BY MOUTH DAILY 90 tablet 3     ondansetron (ZOFRAN ODT) 4 MG ODT tab Take 1 tablet (4 mg) by mouth every 8 hours as needed for nausea (Patient not taking: Reported on 2/8/2024) 15 tablet 0     sertraline (ZOLOFT) 25 MG  tablet Take 1 tablet (25 mg) by mouth daily for 14 days, THEN 2 tablets (50 mg) daily for 14 days, THEN 3 tablets (75 mg) daily for 30 days. 132 tablet 0     sertraline (ZOLOFT) 50 MG tablet Take 1.5 tablets (75 mg) by mouth daily (Patient not taking: Reported on 8/27/2023) 135 tablet 3         PE: No acute distress on video visit.  Nondyspneic appearing speaking in full sentences.  No active coughing.        TREATMENT: None.      ASSESSMENT: Recent COVID infection which the timeline is not clear.  Patient informed about the false positive lab testing after her symptoms have dissipated.  Because I cannot advise her based on her number of days of symptoms we agreed the best plan would be to wear a mask this week and then assume that she is no longer contagious next week.      DIAGNOSIS: COVID infection      PLAN: Patient to wear mask this week.  Assuming no longer contagious next week and that will be at least 10 days from her positive test.

## 2024-04-23 ASSESSMENT — ANXIETY QUESTIONNAIRES
3. WORRYING TOO MUCH ABOUT DIFFERENT THINGS: SEVERAL DAYS
7. FEELING AFRAID AS IF SOMETHING AWFUL MIGHT HAPPEN: NOT AT ALL
GAD7 TOTAL SCORE: 7
GAD7 TOTAL SCORE: 7
8. IF YOU CHECKED OFF ANY PROBLEMS, HOW DIFFICULT HAVE THESE MADE IT FOR YOU TO DO YOUR WORK, TAKE CARE OF THINGS AT HOME, OR GET ALONG WITH OTHER PEOPLE?: SOMEWHAT DIFFICULT
7. FEELING AFRAID AS IF SOMETHING AWFUL MIGHT HAPPEN: NOT AT ALL
1. FEELING NERVOUS, ANXIOUS, OR ON EDGE: MORE THAN HALF THE DAYS
IF YOU CHECKED OFF ANY PROBLEMS ON THIS QUESTIONNAIRE, HOW DIFFICULT HAVE THESE PROBLEMS MADE IT FOR YOU TO DO YOUR WORK, TAKE CARE OF THINGS AT HOME, OR GET ALONG WITH OTHER PEOPLE: SOMEWHAT DIFFICULT
6. BECOMING EASILY ANNOYED OR IRRITABLE: MORE THAN HALF THE DAYS
4. TROUBLE RELAXING: SEVERAL DAYS
5. BEING SO RESTLESS THAT IT IS HARD TO SIT STILL: NOT AT ALL
2. NOT BEING ABLE TO STOP OR CONTROL WORRYING: SEVERAL DAYS

## 2024-04-23 ASSESSMENT — ASTHMA QUESTIONNAIRES
QUESTION_1 LAST FOUR WEEKS HOW MUCH OF THE TIME DID YOUR ASTHMA KEEP YOU FROM GETTING AS MUCH DONE AT WORK, SCHOOL OR AT HOME: NONE OF THE TIME
ACT_TOTALSCORE: 24
QUESTION_2 LAST FOUR WEEKS HOW OFTEN HAVE YOU HAD SHORTNESS OF BREATH: NOT AT ALL
QUESTION_4 LAST FOUR WEEKS HOW OFTEN HAVE YOU USED YOUR RESCUE INHALER OR NEBULIZER MEDICATION (SUCH AS ALBUTEROL): ONCE A WEEK OR LESS
QUESTION_5 LAST FOUR WEEKS HOW WOULD YOU RATE YOUR ASTHMA CONTROL: COMPLETELY CONTROLLED
QUESTION_3 LAST FOUR WEEKS HOW OFTEN DID YOUR ASTHMA SYMPTOMS (WHEEZING, COUGHING, SHORTNESS OF BREATH, CHEST TIGHTNESS OR PAIN) WAKE YOU UP AT NIGHT OR EARLIER THAN USUAL IN THE MORNING: NOT AT ALL
ACT_TOTALSCORE: 24

## 2024-04-26 ENCOUNTER — OFFICE VISIT (OUTPATIENT)
Dept: FAMILY MEDICINE | Facility: CLINIC | Age: 64
End: 2024-04-26
Payer: COMMERCIAL

## 2024-04-26 VITALS
HEART RATE: 61 BPM | SYSTOLIC BLOOD PRESSURE: 115 MMHG | BODY MASS INDEX: 35.55 KG/M2 | OXYGEN SATURATION: 96 % | TEMPERATURE: 98 F | HEIGHT: 69 IN | WEIGHT: 240 LBS | DIASTOLIC BLOOD PRESSURE: 77 MMHG

## 2024-04-26 DIAGNOSIS — Z12.31 ENCOUNTER FOR SCREENING MAMMOGRAM FOR MALIGNANT NEOPLASM OF BREAST: ICD-10-CM

## 2024-04-26 DIAGNOSIS — F33.1 MODERATE EPISODE OF RECURRENT MAJOR DEPRESSIVE DISORDER (H): ICD-10-CM

## 2024-04-26 DIAGNOSIS — L29.2 VULVAR ITCHING: ICD-10-CM

## 2024-04-26 PROCEDURE — 99214 OFFICE O/P EST MOD 30 MIN: CPT | Performed by: FAMILY MEDICINE

## 2024-04-26 RX ORDER — CLOBETASOL PROPIONATE 0.5 MG/G
OINTMENT TOPICAL AT BEDTIME
Qty: 45 G | Refills: 1 | Status: SHIPPED | OUTPATIENT
Start: 2024-04-26

## 2024-04-26 NOTE — PATIENT INSTRUCTIONS
Vulva itching - start with once nightly ointment to the affected areas. If symptoms do not improve in 1-2 weeks then increase to twice daily.     Hemorrhoids - increasing fiber and water intake. Ok to use over the counter preparation H twice daily to the affected are if you are experiencing any itching, irritation or rectal bleeding.

## 2024-04-26 NOTE — PROGRESS NOTES
"  Assessment & Plan     MDD      1/31/2023    10:31 AM 9/8/2023     1:42 AM 2/21/2024     1:06 PM   PHQ   PHQ-9 Total Score 9 3 14   Q9: Thoughts of better off dead/self-harm past 2 weeks Not at all Not at all Not at all      - started increased Zoloft dose 100 mg daily  - continue current dose  - follow up scheduled     Vulvar itching  - supsect vulva lichen sclerosis  - advised below  - Ob/Gyn  Referral; Future  - clobetasol (TEMOVATE) 0.05 % external ointment; Apply topically at bedtime    Encounter for screening mammogram for malignant neoplasm of breast  - MA Screen Bilateral w/Mega; Future            BMI  Estimated body mass index is 35.96 kg/m  as calculated from the following:    Height as of this encounter: 1.74 m (5' 8.5\").    Weight as of this encounter: 108.9 kg (240 lb).         Patient Instructions   Vulva itching - start with once nightly ointment to the affected areas. If symptoms do not improve in 1-2 weeks then increase to twice daily.     Hemorrhoids - increasing fiber and water intake. Ok to use over the counter preparation H twice daily to the affected are if you are experiencing any itching, irritation or rectal bleeding.     Buddy Bowman is a 64 year old, presenting for the following health issues:  Follow Up        4/26/2024    10:50 AM   Additional Questions   Roomed by Sofi     History of Present Illness       Mental Health Follow-up:  Patient presents to follow-up on Depression & Anxiety.Patient's depression since last visit has been:  Medium  The patient is having other symptoms associated with depression.  Patient's anxiety since last visit has been:  Bad  The patient is not having other symptoms associated with anxiety.  Any significant life events: No  Patient is feeling anxious or having panic attacks.  Patient has no concerns about alcohol or drug use.    Reason for visit:  Labia irritation  Symptom onset:  More than a month  Symptoms include:  Itching and " "soreness  Symptom intensity:  Mild  Symptom progression:  Staying the same  Had these symptoms before:  No  What makes it worse:  Biking  What makes it better:  No    She eats 2-3 servings of fruits and vegetables daily.She consumes 0 sweetened beverage(s) daily.She exercises with enough effort to increase her heart rate 9 or less minutes per day.  She exercises with enough effort to increase her heart rate 3 or less days per week.   She is taking medications regularly.       She started Zoloft 100 mg daily. With the lower dose she still had some irritability and not having energy.     Last fall labial irritation started with bike riding. With the various illness, she has had to use pads with urinary coughing. With sitting more she has had more labial irritation - itching/burning. She has also some protrusion in her anus which is more recent. No vaginal discharge or pelvic pain. She does have some mild constipation which is new. No rectal bleeding.           Objective    /77 (BP Location: Right arm, Patient Position: Chair, Cuff Size: Adult Large)   Pulse 61   Temp 98  F (36.7  C) (Oral)   Ht 1.74 m (5' 8.5\")   Wt 108.9 kg (240 lb)   LMP  (LMP Unknown)   SpO2 96%   BMI 35.96 kg/m    Body mass index is 35.96 kg/m .  Physical Exam               Signed Electronically by: Hannah Guillen MD    "

## 2024-06-01 ENCOUNTER — HEALTH MAINTENANCE LETTER (OUTPATIENT)
Age: 64
End: 2024-06-01

## 2024-06-12 ENCOUNTER — NURSE TRIAGE (OUTPATIENT)
Dept: NURSING | Facility: CLINIC | Age: 64
End: 2024-06-12
Payer: COMMERCIAL

## 2024-06-12 ENCOUNTER — TELEPHONE (OUTPATIENT)
Dept: FAMILY MEDICINE | Facility: CLINIC | Age: 64
End: 2024-06-12
Payer: COMMERCIAL

## 2024-06-12 ENCOUNTER — MYC MEDICAL ADVICE (OUTPATIENT)
Dept: FAMILY MEDICINE | Facility: CLINIC | Age: 64
End: 2024-06-12
Payer: COMMERCIAL

## 2024-06-12 NOTE — TELEPHONE ENCOUNTER
Writer to call and triage patient. Patient did not  so left a message.   Writer responded via Schedul[employer].      IRVIN EnglishN ZULEMA  [facility]

## 2024-06-12 NOTE — TELEPHONE ENCOUNTER
Writer called and left message on patient's voicemail to call back and speak with a triage nurse.    Also stated that if having the symptoms again to go in immediately.     IRVIN EnglishN RN  [facility]

## 2024-06-12 NOTE — TELEPHONE ENCOUNTER
"Patient reporting she has been having palpitations periodically for the last week.  Last time an hour ago.  Last evening they lasted three hours. Feeling a little \"pressure\" over her heart that lasts longer than five minutes.    Disposition is to call 911.  Patient said she is at work and was thinking of UC.  Writer advised if patient symptoms are cardiac related, she should be assessed as soon as possible and paramedics can rule out cardiac event.  Patient should be taken to ED not UC.  Patient verbalized understanding.    Abbi Orta RN  [facility]      Reason for Disposition   Chest pain   Chest pain lasting longer than 5 minutes and ANY of the following:    history of heart disease  (i.e., heart attack, bypass surgery, angina, angioplasty, CHF; not just a heart murmur)    described as crushing, pressure-like, or heavy    age > 50    age > 30 AND at least one cardiac risk factor (i.e., hypertension, diabetes, obesity, smoker or strong family history of heart disease)    not relieved with nitroglycerin    Additional Information   Negative: Passed out (i.e., lost consciousness, collapsed and was not responding)   Negative: Shock suspected (e.g., cold/pale/clammy skin, too weak to stand, low BP, rapid pulse)   Negative: Difficult to awaken or acting confused (e.g., disoriented, slurred speech)   Negative: Visible sweat on face or sweat dripping down face   Negative: Unable to walk, or can only walk with assistance (e.g., requires support)   Negative: [1] Received SHOCK from implantable cardiac defibrillator AND [2] persisting symptoms (i.e., palpitations, lightheadedness)   Negative: [1] Dizziness, lightheadedness, or weakness AND [2] heart beating very rapidly (e.g., > 140 / minute)   Negative: [1] Dizziness, lightheadedness, or weakness AND [2] heart beating very slowly (e.g., < 50 / minute)   Negative: Sounds like a life-threatening emergency to the triager   Negative: Implantable Cardiac " Defibrillator (ICD) or a pacemaker symptoms or questions   Negative: SEVERE difficulty breathing (e.g., struggling for each breath, speaks in single words)   Negative: Difficult to awaken or acting confused (e.g., disoriented, slurred speech)   Negative: Shock suspected (e.g., cold/pale/clammy skin, too weak to stand, low BP, rapid pulse)   Negative: Passed out (i.e., lost consciousness, collapsed and was not responding)    Protocols used: Heart Rate and Heartbeat [id number], Chest Pain-A-AH

## 2024-06-27 SDOH — HEALTH STABILITY: PHYSICAL HEALTH: ON AVERAGE, HOW MANY DAYS PER WEEK DO YOU ENGAGE IN MODERATE TO STRENUOUS EXERCISE (LIKE A BRISK WALK)?: 3 DAYS

## 2024-06-27 SDOH — HEALTH STABILITY: PHYSICAL HEALTH: ON AVERAGE, HOW MANY MINUTES DO YOU ENGAGE IN EXERCISE AT THIS LEVEL?: 20 MIN

## 2024-06-27 ASSESSMENT — SOCIAL DETERMINANTS OF HEALTH (SDOH): HOW OFTEN DO YOU GET TOGETHER WITH FRIENDS OR RELATIVES?: TWICE A WEEK

## 2024-06-27 ASSESSMENT — PATIENT HEALTH QUESTIONNAIRE - PHQ9
10. IF YOU CHECKED OFF ANY PROBLEMS, HOW DIFFICULT HAVE THESE PROBLEMS MADE IT FOR YOU TO DO YOUR WORK, TAKE CARE OF THINGS AT HOME, OR GET ALONG WITH OTHER PEOPLE: SOMEWHAT DIFFICULT
SUM OF ALL RESPONSES TO PHQ QUESTIONS 1-9: 7
SUM OF ALL RESPONSES TO PHQ QUESTIONS 1-9: 7

## 2024-06-28 ENCOUNTER — OFFICE VISIT (OUTPATIENT)
Dept: FAMILY MEDICINE | Facility: CLINIC | Age: 64
End: 2024-06-28
Payer: COMMERCIAL

## 2024-06-28 VITALS
RESPIRATION RATE: 20 BRPM | TEMPERATURE: 97 F | SYSTOLIC BLOOD PRESSURE: 126 MMHG | OXYGEN SATURATION: 96 % | WEIGHT: 250 LBS | HEART RATE: 57 BPM | BODY MASS INDEX: 37.03 KG/M2 | DIASTOLIC BLOOD PRESSURE: 85 MMHG | HEIGHT: 69 IN

## 2024-06-28 DIAGNOSIS — Z23 NEED FOR VACCINATION: ICD-10-CM

## 2024-06-28 DIAGNOSIS — F33.1 MODERATE EPISODE OF RECURRENT MAJOR DEPRESSIVE DISORDER (H): ICD-10-CM

## 2024-06-28 DIAGNOSIS — I10 ESSENTIAL HYPERTENSION: ICD-10-CM

## 2024-06-28 DIAGNOSIS — E78.2 MIXED HYPERLIPIDEMIA: ICD-10-CM

## 2024-06-28 DIAGNOSIS — Z00.00 ROUTINE GENERAL MEDICAL EXAMINATION AT A HEALTH CARE FACILITY: Primary | ICD-10-CM

## 2024-06-28 DIAGNOSIS — R07.89 CHEST PRESSURE: ICD-10-CM

## 2024-06-28 DIAGNOSIS — R00.2 PALPITATIONS: ICD-10-CM

## 2024-06-28 DIAGNOSIS — Z13.1 SCREENING FOR DIABETES MELLITUS: ICD-10-CM

## 2024-06-28 DIAGNOSIS — Z12.31 ENCOUNTER FOR SCREENING MAMMOGRAM FOR BREAST CANCER: ICD-10-CM

## 2024-06-28 LAB
ALBUMIN SERPL BCG-MCNC: 4.4 G/DL (ref 3.5–5.2)
ALP SERPL-CCNC: 82 U/L (ref 40–150)
ALT SERPL W P-5'-P-CCNC: 14 U/L (ref 0–50)
ANION GAP SERPL CALCULATED.3IONS-SCNC: 8 MMOL/L (ref 7–15)
AST SERPL W P-5'-P-CCNC: 19 U/L (ref 0–45)
BILIRUB SERPL-MCNC: 0.7 MG/DL
BUN SERPL-MCNC: 14.3 MG/DL (ref 8–23)
CALCIUM SERPL-MCNC: 9.1 MG/DL (ref 8.8–10.2)
CHLORIDE SERPL-SCNC: 105 MMOL/L (ref 98–107)
CHOLEST SERPL-MCNC: 246 MG/DL
CREAT SERPL-MCNC: 0.87 MG/DL (ref 0.51–0.95)
CREAT UR-MCNC: 153 MG/DL
DEPRECATED HCO3 PLAS-SCNC: 28 MMOL/L (ref 22–29)
EGFRCR SERPLBLD CKD-EPI 2021: 74 ML/MIN/1.73M2
FASTING STATUS PATIENT QL REPORTED: YES
FASTING STATUS PATIENT QL REPORTED: YES
GLUCOSE SERPL-MCNC: 102 MG/DL (ref 70–99)
HBA1C MFR BLD: 4.8 % (ref 0–5.6)
HDLC SERPL-MCNC: 54 MG/DL
LDLC SERPL CALC-MCNC: 147 MG/DL
MICROALBUMIN UR-MCNC: <12 MG/L
MICROALBUMIN/CREAT UR: NORMAL MG/G{CREAT}
NONHDLC SERPL-MCNC: 192 MG/DL
POTASSIUM SERPL-SCNC: 4.6 MMOL/L (ref 3.4–5.3)
PROT SERPL-MCNC: 7 G/DL (ref 6.4–8.3)
SODIUM SERPL-SCNC: 141 MMOL/L (ref 135–145)
TRIGL SERPL-MCNC: 225 MG/DL
TSH SERPL DL<=0.005 MIU/L-ACNC: 2.39 UIU/ML (ref 0.3–4.2)

## 2024-06-28 PROCEDURE — 90471 IMMUNIZATION ADMIN: CPT | Performed by: FAMILY MEDICINE

## 2024-06-28 PROCEDURE — 99396 PREV VISIT EST AGE 40-64: CPT | Mod: 25 | Performed by: FAMILY MEDICINE

## 2024-06-28 PROCEDURE — 90678 RSV VACC PREF BIVALENT IM: CPT | Performed by: FAMILY MEDICINE

## 2024-06-28 PROCEDURE — 80053 COMPREHEN METABOLIC PANEL: CPT | Performed by: FAMILY MEDICINE

## 2024-06-28 PROCEDURE — 90472 IMMUNIZATION ADMIN EACH ADD: CPT | Performed by: FAMILY MEDICINE

## 2024-06-28 PROCEDURE — 90750 HZV VACC RECOMBINANT IM: CPT | Performed by: FAMILY MEDICINE

## 2024-06-28 PROCEDURE — 96127 BRIEF EMOTIONAL/BEHAV ASSMT: CPT | Performed by: FAMILY MEDICINE

## 2024-06-28 PROCEDURE — 90677 PCV20 VACCINE IM: CPT | Performed by: FAMILY MEDICINE

## 2024-06-28 PROCEDURE — 80061 LIPID PANEL: CPT | Performed by: FAMILY MEDICINE

## 2024-06-28 PROCEDURE — 82043 UR ALBUMIN QUANTITATIVE: CPT | Performed by: FAMILY MEDICINE

## 2024-06-28 PROCEDURE — 91320 SARSCV2 VAC 30MCG TRS-SUC IM: CPT | Performed by: FAMILY MEDICINE

## 2024-06-28 PROCEDURE — 90480 ADMN SARSCOV2 VAC 1/ONLY CMP: CPT | Performed by: FAMILY MEDICINE

## 2024-06-28 PROCEDURE — 36415 COLL VENOUS BLD VENIPUNCTURE: CPT | Performed by: FAMILY MEDICINE

## 2024-06-28 PROCEDURE — 84443 ASSAY THYROID STIM HORMONE: CPT | Performed by: FAMILY MEDICINE

## 2024-06-28 PROCEDURE — 99214 OFFICE O/P EST MOD 30 MIN: CPT | Mod: 25 | Performed by: FAMILY MEDICINE

## 2024-06-28 PROCEDURE — 82570 ASSAY OF URINE CREATININE: CPT | Performed by: FAMILY MEDICINE

## 2024-06-28 PROCEDURE — 83036 HEMOGLOBIN GLYCOSYLATED A1C: CPT | Performed by: FAMILY MEDICINE

## 2024-06-28 RX ORDER — METOPROLOL SUCCINATE 50 MG/1
50 TABLET, EXTENDED RELEASE ORAL DAILY
Qty: 90 TABLET | Refills: 3 | Status: SHIPPED | OUTPATIENT
Start: 2024-06-28

## 2024-06-28 RX ORDER — SERTRALINE HYDROCHLORIDE 100 MG/1
200 TABLET, FILM COATED ORAL DAILY
Qty: 180 TABLET | Refills: 3 | Status: SHIPPED | OUTPATIENT
Start: 2024-06-28 | End: 2025-06-23

## 2024-06-28 NOTE — PATIENT INSTRUCTIONS
"Patient Education   Preventive Care Advice   This is general advice we often give to help people stay healthy. Your care team may have specific advice just for you. Please talk to your care team about your own preventive care needs.  Lifestyle  Exercise at least 150 minutes each week (30 minutes a day, 5 days a week).  Do muscle strengthening activities 2 days a week. These help control your weight and prevent disease.  No smoking.  Wear sunscreen to prevent skin cancer.  Have your home tested for radon every 2 to 5 years. Radon is a colorless, odorless gas that can harm your lungs. To learn more, go to www.health.[facility] and search for \"Radon in Homes.\"  Keep guns unloaded and locked up in a safe place like a safe or gun vault, or, use a gun lock and hide the keys. Always lock away bullets separately. To learn more, visit [employer] [facility] labs.[place].gov and search for \"safe gun storage.\"  Nutrition  Eat 5 or more servings of fruits and vegetables each day.  Try wheat bread, brown rice and whole grain pasta (instead of white bread, rice, and pasta).  Get enough calcium and vitamin D. Check the label on foods and aim for 100% of the RDA (recommended daily allowance).  Regular exams  Have a dental exam and cleaning every 6 months.  See your health care team every year to talk about:  Any changes in your health.  Any medicines your care team has prescribed.  Preventive care, family planning, and ways to prevent chronic diseases.  Shots (vaccines)   HPV shots (up to age 26), if you've never had them before.  Hepatitis B shots (up to age 59), if you've never had them before.  COVID-19 shot: Get this shot when it's due.  Flu shot: Get a flu shot every year.  Tetanus shot: Get a tetanus shot every 10 years.  Pneumococcal, hepatitis A, and RSV shots: Ask your care team if you need these based on your risk.  Shingles shot (for age 50 and up).  General health tests  Diabetes screening:  Starting at age 35, Get screened for diabetes at least " every 3 years.  If you are younger than age 35, ask your care team if you should be screened for diabetes.  Cholesterol test: At age 39, start having a cholesterol test every 5 years, or more often if advised.  Bone density scan (DEXA): At age 50, ask your care team if you should have this scan for osteoporosis (brittle bones).  Hepatitis C: Get tested at least once in your life.  Abdominal aortic aneurysm screening: Talk to your doctor about having this screening if you:  Have ever smoked; and  Are biologically male; and  Are between the ages of 65 and 75.  STIs (sexually transmitted infections)  Before age 24: Ask your care team if you should be screened for STIs.  After age 24: Get screened for STIs if you're at risk. You are at risk for STIs (including HIV) if:  You are sexually active with more than one person.  You don't use condoms every time.  You or a partner was diagnosed with a sexually transmitted infection.  If you are at risk for HIV, ask about PrEP medicine to prevent HIV.  Get tested for HIV at least once in your life, whether you are at risk for HIV or not.  Cancer screening tests  Cervical cancer screening: If you have a cervix, begin getting regular cervical cancer screening tests at age 21. Most people who have regular screenings with normal results can stop after age 65. Talk about this with your provider.  Breast cancer scan (mammogram): If you've ever had breasts, begin having regular mammograms starting at age 40. This is a scan to check for breast cancer.  Colon cancer screening: It is important to start screening for colon cancer at age 45.  Have a colonoscopy test every 10 years (or more often if you're at risk) Or, ask your provider about stool tests like a FIT test every year or Cologuard test every 3 years.  To learn more about your testing options, visit: www.[employer]/059738.pdf.  For help making a decision, visit: deborah/el49173.  Prostate cancer screening test: If you have a  prostate and are age 55 to 69, ask your provider if you would benefit from a yearly prostate cancer screening test.  Lung cancer screening: If you are a current or former smoker age 50 to 80, ask your care team if ongoing lung cancer screenings are right for you.  For informational purposes only. Not to replace the advice of your health care provider. Copyright   2023 [facility]. All rights reserved. Clinically reviewed by the [facility] Transitions Program. [employer] 223392 - REV 04/24.  Learning About Depression Screening  What is depression screening?  Depression screening is a way to see if you have depression symptoms. It may be done by a doctor or counselor. It's often part of a routine checkup. That's because your mental health is just as important as your physical health.  Depression is a mental health condition that affects how you feel, think, and act. You may:  Have less energy.  Lose interest in your daily activities.  Feel sad and grouchy for a long time.  Depression is very common. It affects people of all ages.  Many things can lead to depression. Some people become depressed after they have a stroke or find out they have a major illness like cancer or heart disease. The death of a loved one or a breakup may lead to depression. It can run in families. Most experts believe that a combination of inherited genes and stressful life events can cause it.  What happens during screening?  You may be asked to fill out a form about your depression symptoms. You and the doctor will discuss your answers. The doctor may ask you more questions to learn more about how you think, act, and feel.  What happens after screening?  If you have symptoms of depression, your doctor will talk to you about your options.  Doctors usually treat depression with medicines or counseling. Often, combining the two works best. Many people don't get help because they think that they'll get over the depression on  "their own. But people with depression may not get better unless they get treatment.  The cause of depression is not well understood. There may be many factors involved. But if you have depression, it's not your fault.  A serious symptom of depression is thinking about death or suicide. If you or someone you care about talks about this or about feeling hopeless, get help right away.  It's important to know that depression can be treated. Medicine, counseling, and self-care may help.  Where can you learn more?  Go to https://www.[employer].net/patiented  Enter T185 in the search box to learn more about \"Learning About Depression Screening.\"  Current as of: June 24, 2023               Content Version: 14.0    1583-5243 [facility].   Care instructions adapted under license by your healthcare professional. If you have questions about a medical condition or this instruction, always ask your healthcare professional. [employer], [employer] disclaims any warranty or liability for your use of this information.         "

## 2024-06-28 NOTE — NURSING NOTE
Prior to immunization administration, verified patients identity using patient s name and date of birth. Please see Immunization Activity for additional information.     Screening Questionnaire for Adult Immunization    Are you sick today?   No   Do you have allergies to medications, food, a vaccine component or latex?   No   Have you ever had a serious reaction after receiving a vaccination?   No   Do you have a long-term health problem with heart, lung, kidney, or metabolic disease (e.g., diabetes), asthma, a blood disorder, no spleen, complement component deficiency, a cochlear implant, or a spinal fluid leak?  Are you on long-term aspirin therapy?   No   Do you have cancer, leukemia, HIV/AIDS, or any other immune system problem?   No   Do you have a parent, brother, or sister with an immune system problem?   No   In the past 3 months, have you taken medications that affect  your immune system, such as prednisone, other steroids, or anticancer drugs; drugs for the treatment of rheumatoid arthritis, Crohn s disease, or psoriasis; or have you had radiation treatments?   No   Have you had a seizure, or a brain or other nervous system problem?   No   During the past year, have you received a transfusion of blood or blood    products, or been given immune (gamma) globulin or antiviral drug?   No   For women: Are you pregnant or is there a chance you could become       pregnant during the next month?   No   Have you received any vaccinations in the past 4 weeks?   No     Immunization questionnaire answers were all negative.      Patient instructed to remain in clinic for 15 minutes afterwards, and to report any adverse reactions.     Screening performed by Sadia Jason MA on 6/28/2024 at 12:14 PM.

## 2024-06-28 NOTE — PROGRESS NOTES
Preventive Care Visit  [facility]  Hannah Guillen MD, Family Medicine  Jun 28, 2024      Assessment & Plan     Routine general medical examination at a health care facility  Will schedule with derm and gyn  - PRIMARY CARE FOLLOW-UP SCHEDULING; Future    Moderate episode of recurrent major depressive disorder (H)      9/8/2023     1:42 AM 2/21/2024     1:06 PM 6/27/2024    10:44 PM   PHQ   PHQ-9 Total Score 3 14 7   Q9: Thoughts of better off dead/self-harm past 2 weeks Not at all Not at all Not at all    - Symptoms stable, continue current dose   - sertraline (ZOLOFT) 100 MG tablet; Take 2 tablets (200 mg) by mouth daily for 360 days    Essential hypertension  - stable, refill below   - metoprolol succinate ER (TOPROL XL) 50 MG 24 hr tablet; Take 1 tablet (50 mg) by mouth daily  - TSH with free T4 reflex; Future  - Comprehensive metabolic panel (BMP + Alb, Alk Phos, ALT, AST, Total. Bili, TP); Future  - Albumin Random Urine Quantitative with Creat Ratio; Future  - TSH with free T4 reflex  - Comprehensive metabolic panel (BMP + Alb, Alk Phos, ALT, AST, Total. Bili, TP)  - Albumin Random Urine Quantitative with Creat Ratio    Palpitations  - recent ER work up reassuring, referred to cardiology for further evaluation   - Adult Cardiology Eval  Referral; Future    Chest pressure  - recent ER work up reassuring, referred to cardiology for further evaluation   - Adult Cardiology Eval  Referral; Future    Screening for diabetes mellitus  - Hemoglobin A1c; Future  - Hemoglobin A1c    Need for vaccination  - Pneumococcal 20 Valent Conjugate (Prevnar 20)  - RSV VACCINE (ABRYSVO)  - ZOSTER RECOMBINANT ADJUVANTED (SHINGRIX)  - COVID-19 12+ (2023-24) (PFIZER)    Encounter for screening mammogram for breast cancer  - MA Screen Bilateral w/Mega; Future    Mixed hyperlipidemia  - if lipid panel elevated interested in lifestyle modifications and three month follow up   - Lipid  "panel reflex to direct LDL Fasting; Future  - Lipid panel reflex to direct LDL Fasting            BMI  Estimated body mass index is 37.46 kg/m  as calculated from the following:    Height as of this encounter: 1.74 m (5' 8.5\").    Weight as of this encounter: 113.4 kg (250 lb).       Counseling  Appropriate preventive services were discussed with this patient, including applicable screening as appropriate for fall prevention, nutrition, physical activity, Tobacco-use cessation, weight loss and cognition.  Checklist reviewing preventive services available has been given to the patient.  Reviewed patient's diet, addressing concerns and/or questions.   She is at risk for lack of exercise and has been provided with information to increase physical activity for the benefit of her well-being.   The patient's PHQ-9 score is consistent with mild depression. She was provided with information regarding depression.             Buddy Bowman is a 64 year old, presenting for the following:  Physical        6/28/2024    11:19 AM   Additional Questions   Roomed by rob   Accompanied by self        Health Care Directive  Patient does not have a Health Care Directive or Living Will: Discussed advance care planning with patient; information given to patient to review.    HPI    She increased Zoloft to 150 and then 200 mg, doing well with higher dose.         6/27/2024   General Health   How would you rate your overall physical health? Good   Feel stress (tense, anxious, or unable to sleep) Not at all            6/27/2024   Nutrition   Three or more servings of calcium each day? (!) NO   Diet: Regular (no restrictions)   How many servings of fruit and vegetables per day? (!) 2-3   How many sweetened beverages each day? 0-1            6/27/2024   Exercise   Days per week of moderate/strenous exercise 3 days   Average minutes spent exercising at this level 20 min            6/27/2024   Social Factors   Frequency of gathering " with friends or relatives Twice a week   Worry food won't last until get money to buy more No   Food not last or not have enough money for food? No   Do you have housing? (Housing is defined as stable permanent housing and does not include staying ouside in a car, in a tent, in an abandoned building, in an overnight shelter, or couch-surfing.) Yes   Are you worried about losing your housing? No   Lack of transportation? No   Unable to get utilities (heat,electricity)? No            6/27/2024   Fall Risk   Fallen 2 or more times in the past year? No   Trouble with walking or balance? No             6/27/2024   Dental   Dentist two times every year? Yes            6/27/2024   TB Screening   Were you born outside of the [place]? Yes          Today's PHQ-9 Score:       6/27/2024    10:44 PM   PHQ-9 SCORE   PHQ-9 Total Score MyChart 7 (Mild depression)   PHQ-9 Total Score 7         6/27/2024   Substance Use   Alcohol more than 3/day or more than 7/wk No   Do you use any other substances recreationally? No        Social History     Tobacco Use    Smoking status: Never    Smokeless tobacco: Never   Vaping Use    Vaping status: Never Used   Substance Use Topics    Alcohol use: Yes     Comment: One drink 1-2 times a month    Drug use: No                  6/27/2024   STI Screening   New sexual partner(s) since last STI/HIV test? No        History of abnormal Pap smear: Status post hysterectomy with removal of cervix and no history of CIN2 or greater or cervical cancer. Health Maintenance and Surgical History updated.       ASCVD Risk   The 10-year ASCVD risk score (Connor LUNA, et al., 2019) is: 6%    Values used to calculate the score:      Age: 64 years      Sex: Female      Is Non- : No      Diabetic: No      Tobacco smoker: No      Systolic Blood Pressure: 115 mmHg      Is BP treated: Yes      HDL Cholesterol: 46 mg/dL      Total Cholesterol: 237 mg/dL           Reviewed and updated as needed  "this visit by Provider                             Objective    Exam  LMP  (LMP Unknown)    Estimated body mass index is 35.96 kg/m  as calculated from the following:    Height as of 4/26/24: 1.74 m (5' 8.5\").    Weight as of 4/26/24: 108.9 kg (240 lb).    Physical Exam          Signed Electronically by: Hannah Guillen MD    Answers submitted by the patient for this visit:  Patient Health Questionnaire (Submitted on 6/27/2024)  If you checked off any problems, how difficult have these problems made it for you to do your work, take care of things at home, or get along with other people?: Somewhat difficult  PHQ9 TOTAL SCORE: 7    "

## 2024-12-16 DIAGNOSIS — Z12.11 COLON CANCER SCREENING: ICD-10-CM

## 2024-12-19 ENCOUNTER — PATIENT OUTREACH (OUTPATIENT)
Dept: CARE COORDINATION | Facility: CLINIC | Age: 64
End: 2024-12-19
Payer: COMMERCIAL

## 2025-01-27 ENCOUNTER — TRANSFERRED RECORDS (OUTPATIENT)
Dept: HEALTH INFORMATION MANAGEMENT | Facility: CLINIC | Age: 65
End: 2025-01-27
Payer: COMMERCIAL

## 2025-02-18 ASSESSMENT — ASTHMA QUESTIONNAIRES
ACT_TOTALSCORE: 25
QUESTION_2 LAST FOUR WEEKS HOW OFTEN HAVE YOU HAD SHORTNESS OF BREATH: NOT AT ALL
QUESTION_1 LAST FOUR WEEKS HOW MUCH OF THE TIME DID YOUR ASTHMA KEEP YOU FROM GETTING AS MUCH DONE AT WORK, SCHOOL OR AT HOME: NONE OF THE TIME
QUESTION_3 LAST FOUR WEEKS HOW OFTEN DID YOUR ASTHMA SYMPTOMS (WHEEZING, COUGHING, SHORTNESS OF BREATH, CHEST TIGHTNESS OR PAIN) WAKE YOU UP AT NIGHT OR EARLIER THAN USUAL IN THE MORNING: NOT AT ALL
ACT_TOTALSCORE: 25
QUESTION_5 LAST FOUR WEEKS HOW WOULD YOU RATE YOUR ASTHMA CONTROL: COMPLETELY CONTROLLED
QUESTION_4 LAST FOUR WEEKS HOW OFTEN HAVE YOU USED YOUR RESCUE INHALER OR NEBULIZER MEDICATION (SUCH AS ALBUTEROL): NOT AT ALL

## 2025-02-18 ASSESSMENT — PATIENT HEALTH QUESTIONNAIRE - PHQ9
SUM OF ALL RESPONSES TO PHQ QUESTIONS 1-9: 5
SUM OF ALL RESPONSES TO PHQ QUESTIONS 1-9: 5
10. IF YOU CHECKED OFF ANY PROBLEMS, HOW DIFFICULT HAVE THESE PROBLEMS MADE IT FOR YOU TO DO YOUR WORK, TAKE CARE OF THINGS AT HOME, OR GET ALONG WITH OTHER PEOPLE: SOMEWHAT DIFFICULT

## 2025-02-19 ENCOUNTER — OFFICE VISIT (OUTPATIENT)
Dept: FAMILY MEDICINE | Facility: CLINIC | Age: 65
End: 2025-02-19
Payer: COMMERCIAL

## 2025-02-19 ENCOUNTER — TELEPHONE (OUTPATIENT)
Dept: FAMILY MEDICINE | Facility: CLINIC | Age: 65
End: 2025-02-19

## 2025-02-19 VITALS
HEIGHT: 68 IN | BODY MASS INDEX: 39.77 KG/M2 | SYSTOLIC BLOOD PRESSURE: 151 MMHG | OXYGEN SATURATION: 99 % | TEMPERATURE: 98.5 F | WEIGHT: 262.4 LBS | DIASTOLIC BLOOD PRESSURE: 96 MMHG | RESPIRATION RATE: 18 BRPM | HEART RATE: 64 BPM

## 2025-02-19 DIAGNOSIS — F90.9 ATTENTION DEFICIT HYPERACTIVITY DISORDER (ADHD), UNSPECIFIED ADHD TYPE: ICD-10-CM

## 2025-02-19 DIAGNOSIS — I10 ESSENTIAL HYPERTENSION: ICD-10-CM

## 2025-02-19 DIAGNOSIS — Z12.11 SCREEN FOR COLON CANCER: ICD-10-CM

## 2025-02-19 DIAGNOSIS — F33.1 MODERATE EPISODE OF RECURRENT MAJOR DEPRESSIVE DISORDER (H): ICD-10-CM

## 2025-02-19 DIAGNOSIS — Z12.31 ENCOUNTER FOR SCREENING MAMMOGRAM FOR BREAST CANCER: ICD-10-CM

## 2025-02-19 DIAGNOSIS — E66.01 MORBID OBESITY (H): ICD-10-CM

## 2025-02-19 PROCEDURE — 99214 OFFICE O/P EST MOD 30 MIN: CPT | Performed by: FAMILY MEDICINE

## 2025-02-19 PROCEDURE — G2211 COMPLEX E/M VISIT ADD ON: HCPCS | Performed by: FAMILY MEDICINE

## 2025-02-19 RX ORDER — METHYLPHENIDATE HYDROCHLORIDE 36 MG/1
36 TABLET ORAL EVERY MORNING
Qty: 90 TABLET | Refills: 0 | Status: SHIPPED | OUTPATIENT
Start: 2025-02-19

## 2025-02-19 RX ORDER — SERTRALINE HYDROCHLORIDE 100 MG/1
200 TABLET, FILM COATED ORAL DAILY
Qty: 180 TABLET | Refills: 3 | Status: SHIPPED | OUTPATIENT
Start: 2025-02-19

## 2025-02-19 RX ORDER — METHYLPHENIDATE HYDROCHLORIDE 36 MG/1
TABLET ORAL
Status: CANCELLED | OUTPATIENT
Start: 2025-02-19

## 2025-02-19 RX ORDER — METOPROLOL SUCCINATE 50 MG/1
50 TABLET, EXTENDED RELEASE ORAL DAILY
Qty: 90 TABLET | Refills: 3 | Status: SHIPPED | OUTPATIENT
Start: 2025-02-19

## 2025-02-19 ASSESSMENT — PAIN SCALES - GENERAL: PAINLEVEL_OUTOF10: NO PAIN (0)

## 2025-02-19 NOTE — PROGRESS NOTES
"  Assessment & Plan     Moderate episode of recurrent major depressive disorder (H)      2/21/2024     1:06 PM 6/27/2024    10:44 PM 2/18/2025    11:46 PM   PHQ   PHQ-9 Total Score 14 7 5    Q9: Thoughts of better off dead/self-harm past 2 weeks Not at all Not at all Not at all       Patient-reported    - stable, refill below   - sertraline (ZOLOFT) 100 MG tablet; Take 2 tablets (200 mg) by mouth daily.    Morbid obesity (H)  - discussed medication options, interested in GLP1  - no contraindications  - sent starting dose and will touch base in three weeks  - if tolerating medication will send script for Wegovy 0.5 mg once/week X 1 month then Wegovy 1 mg once/week X 1 month. Follow up scheduled with Dr. Chapin   - marilee-weight management (WEGOVY) 0.25 MG/0.5ML pen; Inject 0.5 mLs (0.25 mg) subcutaneously once a week.    Attention deficit hyperactivity disorder (ADHD), unspecified ADHD type  - CSA updated  -  reviewed no concerns   - methylphenidate HCl ER, OSM, (CONCERTA) 36 MG CR tablet; Take 1 tablet (36 mg) by mouth every morning.    Essential hypertension  - she hasn't been consistently taking metoprolol  - refill below  - monitor home BP  - metoprolol succinate ER (TOPROL XL) 50 MG 24 hr tablet; Take 1 tablet (50 mg) by mouth daily.    Screen for colon cancer  - will return new cologard sample     Encounter for screening mammogram for breast cancer  - MA Screen Bilateral w/Mega; Future          BMI  Estimated body mass index is 39.36 kg/m  as calculated from the following:    Height as of this encounter: 1.739 m (5' 8.47\").    Weight as of this encounter: 119 kg (262 lb 6.4 oz).             Buddy Bowamn is a 64 year old, presenting for the following health issues:  Refill Request and Medication Request (For weight loss medication.)      2/19/2025     8:18 AM   Additional Questions   Roomed by Traci COUCH     History of Present Illness       Reason for visit:  ADHD med renewal    She eats 2-3 servings " "of fruits and vegetables daily.She consumes 1 sweetened beverage(s) daily.She exercises with enough effort to increase her heart rate 9 or less minutes per day.  She exercises with enough effort to increase her heart rate 3 or less days per week. She is missing 1 dose(s) of medications per week.  She is not taking prescribed medications regularly due to remembering to take.     ADHD - she takes medication mostly days where she works ~ 3.5 days per week. Sometimes she forgets. She knows she does better on the medication.       She has gained 35 lbs. She feels that she has lost control of weight loss. Hearing from some other friends benefits that they have gotten from weight loss drugs which helps with cravings and having a sense of feeling satiated. Activity is not the best with work schedule she hasn't found a way to do that. She has had habits from a good part of her life not being as active especially during the winter.             Objective    BP (!) 136/93   Pulse 64   Temp (!) 96.7  F (35.9  C) (Temporal)   Resp 18   Ht 1.739 m (5' 8.47\")   Wt 119 kg (262 lb 6.4 oz)   LMP  (LMP Unknown)   SpO2 99%   BMI 39.36 kg/m    Body mass index is 39.36 kg/m .  Physical Exam               Signed Electronically by: Hannah Guillen MD    "

## 2025-02-19 NOTE — TELEPHONE ENCOUNTER
Retail Pharmacy Prior Authorization Team   Phone: [phone]    PRIOR AUTHORIZATION DENIED    Medication: WEGOVY 0.25 MG/0.5ML SC SOAJ  Insurance Company: [employer] [place] - Phone [phone] Fax [phone]  Denial Date: 2/19/2025  Denial Reason(s): WEIGHT LOSS DRUGS ARE EXCLUDED FROM COVERAGE  Appeal Information: N/A  Patient Notified: NO

## 2025-02-19 NOTE — LETTER
[facility]  02/19/25  Patient: Gracie Parra  YOB: 1960  Medical Record Number: [id number]                                                                                  Non-Opioid Controlled Substance Agreement    This is an agreement between you and your provider regarding safe and appropriate use of controlled substances prescribed by your care team. Controlled substances are?medicines that can cause physical and mental dependence (abuse).     There are strict laws about having and using these medicines. We here at [facility] are  committed to working with you in your efforts to get better. To support you in this work, we'll help you schedule regular office appointments for medicine refills. If we must cancel or change your appointment for any reason, we'll make sure you have enough medicine to last until your next appointment.     As a Provider, I will:   Listen carefully to your concerns while treating you with respect.   Recommend a treatment plan that I believe is in your best interest and may involve therapies other than medicine.    Talk with you often about the possible benefits and the risk of harm of any medicine that we prescribe for you.  Assess the safety of this medicine and check how well it works.    Provide a plan on how to taper (discontinue or go off) using this medicine if the decision is made to stop its use.      ::  As a Patient, I understand controlled substances:     Are prescribed by my care provider to help me function or work and manage my condition(s).?  Are strong medicines and can cause serious side effects.     Need to be taken exactly as prescribed.?Combining controlled substances with certain medicines or chemicals (such as illegal drugs, alcohol, sedatives, sleeping pills, and benzodiazepines) can be dangerous or even fatal.? If I stop taking my medicines suddenly, I may have severe withdrawal symptoms.     The risks,  benefits, and side effects of these medicine(s) were explained to me. I agree that:    I will take part in other treatments as advised by my care team. This may be psychiatry or counseling, physical therapy, behavioral therapy, group treatment or a referral to specialist.    I will keep all my appointments and understand this is part of the monitoring of controlled substances.?My care team may require an office visit for EVERY controlled substance refill. If I miss appointments or don t follow instructions, my care team may stop my medicine    I will take my medicines as prescribed. I will not change the dose or schedule unless my care team tells me to. There will be no refills if I run out early.      I may be asked to come to the clinic and complete a urine drug test or complete a pill count. If I don t give a urine sample or participate in a pill count, the care team may stop my medicine.    I will only receive controlled substance prescriptions from this clinic. If I am treated by another provider, I will tell them that I am taking controlled substances and that I have a treatment agreement with this provider. I will inform my [facility] care team within one business day if I am given a prescription for any controlled substance by another healthcare provider. My [facility] care team can contact other providers and pharmacists about my use of any medicines.    It is up to me to make sure that I don't run out of my medicines on weekends or holidays.?If my care team is willing to refill my prescription without a visit, I must request refills only during office hours. Refills may take up to 3 business days to process. I will use one pharmacy to fill all my controlled substance prescriptions. I will notify the clinic about any changes to my insurance or medicine availability.    I am responsible for my prescriptions. If the medicine/prescription is lost, stolen or destroyed, it will not be replaced.?I  also agree not to share controlled substance medicines with anyone.     I am aware I should not use any illegal or recreational drugs. I agree not to drink alcohol unless my care team says I can.     If I enroll in the [place] Medical Cannabis program, I will tell my care team before my next refill.    I will tell my care team right away if I become pregnant, have a new medical problem treated outside of my regular clinic, or have a change in my medicines.     I understand that this medicine can affect my thinking, judgment and reaction time.? Alcohol and drugs affect the brain and body, which can affect the safety of my driving. Being under the influence of alcohol or drugs can affect my decision-making, behaviors, personal safety and the safety of others. Driving while impaired (DWI) can occur if a person is driving, operating or in physical control of a car, motorcycle, boat, snowmobile, ATV, motorbike, off-road vehicle or any other motor vehicle ([place] Statute 169A.20). I understand the risk if I choose to drive or operate any vehicle or machinery.    I understand that if I do not follow any of the conditions above, my prescriptions or treatment may be stopped or changed.   I agree that my provider, clinic care team and pharmacy may work with any city, state or federal law enforcement agency that investigates the misuse, sale or other diversion of my controlled medicine. I will allow my provider to discuss my care with, or share a copy of, this agreement with any other treating provider, pharmacy or emergency room where I receive care.     I have read this agreement and have asked questions about anything I did not understand.    ________________________________________________________  Patient Signature - Gracie Parra     ___________________                   Date     ________________________________________________________  Provider Signature - Hannah Guillen MD       ___________________                    Date     ________________________________________________________  Witness Signature (required if provider not present while patient signing)          ___________________                   Date

## 2025-02-20 ENCOUNTER — PATIENT OUTREACH (OUTPATIENT)
Dept: CARE COORDINATION | Facility: CLINIC | Age: 65
End: 2025-02-20
Payer: COMMERCIAL

## 2025-03-18 ENCOUNTER — PATIENT OUTREACH (OUTPATIENT)
Dept: CARE COORDINATION | Facility: CLINIC | Age: 65
End: 2025-03-18
Payer: COMMERCIAL

## 2025-04-12 ENCOUNTER — HEALTH MAINTENANCE LETTER (OUTPATIENT)
Age: 65
End: 2025-04-12

## 2025-05-21 ENCOUNTER — OFFICE VISIT (OUTPATIENT)
Dept: FAMILY MEDICINE | Facility: CLINIC | Age: 65
End: 2025-05-21
Payer: COMMERCIAL

## 2025-05-21 VITALS
RESPIRATION RATE: 18 BRPM | TEMPERATURE: 96.1 F | DIASTOLIC BLOOD PRESSURE: 80 MMHG | OXYGEN SATURATION: 97 % | BODY MASS INDEX: 40.11 KG/M2 | HEIGHT: 69 IN | HEART RATE: 60 BPM | WEIGHT: 270.8 LBS | SYSTOLIC BLOOD PRESSURE: 150 MMHG

## 2025-05-21 DIAGNOSIS — F33.0 MILD EPISODE OF RECURRENT MAJOR DEPRESSIVE DISORDER: ICD-10-CM

## 2025-05-21 DIAGNOSIS — Z51.81 ENCOUNTER FOR MEDICATION MONITORING: ICD-10-CM

## 2025-05-21 DIAGNOSIS — I10 BENIGN ESSENTIAL HYPERTENSION: ICD-10-CM

## 2025-05-21 DIAGNOSIS — E66.01 MORBID OBESITY (H): ICD-10-CM

## 2025-05-21 DIAGNOSIS — E66.812 CLASS 2 SEVERE OBESITY WITH SERIOUS COMORBIDITY AND BODY MASS INDEX (BMI) OF 39.0 TO 39.9 IN ADULT, UNSPECIFIED OBESITY TYPE (H): ICD-10-CM

## 2025-05-21 DIAGNOSIS — E66.01 CLASS 2 SEVERE OBESITY WITH SERIOUS COMORBIDITY AND BODY MASS INDEX (BMI) OF 39.0 TO 39.9 IN ADULT, UNSPECIFIED OBESITY TYPE (H): ICD-10-CM

## 2025-05-21 DIAGNOSIS — F90.9 ATTENTION DEFICIT HYPERACTIVITY DISORDER (ADHD), UNSPECIFIED ADHD TYPE: Primary | ICD-10-CM

## 2025-05-21 PROCEDURE — 1126F AMNT PAIN NOTED NONE PRSNT: CPT | Performed by: FAMILY MEDICINE

## 2025-05-21 PROCEDURE — 3078F DIAST BP <80 MM HG: CPT | Performed by: FAMILY MEDICINE

## 2025-05-21 PROCEDURE — 3077F SYST BP >= 140 MM HG: CPT | Performed by: FAMILY MEDICINE

## 2025-05-21 PROCEDURE — 99214 OFFICE O/P EST MOD 30 MIN: CPT | Performed by: FAMILY MEDICINE

## 2025-05-21 RX ORDER — METHYLPHENIDATE HYDROCHLORIDE 36 MG/1
36 TABLET ORAL EVERY MORNING
Qty: 90 TABLET | Refills: 0 | Status: SHIPPED | OUTPATIENT
Start: 2025-05-21

## 2025-05-21 RX ORDER — LOSARTAN POTASSIUM 25 MG/1
25 TABLET ORAL DAILY
Qty: 60 TABLET | Refills: 0 | Status: SHIPPED | OUTPATIENT
Start: 2025-05-21

## 2025-05-21 ASSESSMENT — PAIN SCALES - GENERAL: PAINLEVEL_OUTOF10: NO PAIN (0)

## 2025-05-21 NOTE — PROGRESS NOTES
"  Assessment & Plan     Attention deficit hyperactivity disorder (ADHD), unspecified ADHD type  Encounter for medication monitoring  -Medication working well to manage ADHD symptoms, no side effects.  -Follow-up every 3 months alternating in-person visit and evisit/video visit  -CSA up to date. Due for UDS.  - reviewed, no concerning activities   - Urine Drug Screen  - methylphenidate HCl ER, OSM, (CONCERTA) 36 MG CR tablet  Dispense: 90 tablet; Refill: 0    Benign essential hypertension  -Not at goal of <140/90. Discussed adding on losartan, side effects discussed. Consider weaning of metoprolol if BP better managed with losartan.  -BMP check in two weeks while after starting losartan to monitor kidney function and electrolytes.   - losartan (COZAAR) 25 MG tablet  Dispense: 60 tablet; Refill: 0  - Basic metabolic panel  (Ca, Cl, CO2, Creat, Gluc, K, Na, BUN)  -Follow-up in 4 weeks as scheduled for monitoring    Mild episode of recurrent major depressive disorder  -Stable, on sertraline    Class 2 severe obesity with serious comorbidity and body mass index (BMI) of 39.0 to 39.9 in adult, unspecified obesity type (H)  -Was prescribed compounded Weygovy last visit but did not hear from pharmacy. Pt will be paying out of pocket for med. Pt given pharmacy number to call to get medication.            BMI  Estimated body mass index is 39.99 kg/m  as calculated from the following:    Height as of this encounter: 1.753 m (5' 9\").    Weight as of this encounter: 122.8 kg (270 lb 12.8 oz).             Subjective    is a 65 year old, presenting for the following health issues:  Recheck Medication (Wegovy)        5/21/2025     8:59 AM   Additional Questions   Roomed by Rashad   Accompanied by Self     History of Present Illness       Hypertension:  presents for follow up of hypertension.   does not check blood pressure  regularly outside of the clinic. Outpatient blood pressures have not been over 140/90.  does not " "follow a low salt diet.     Col eats 2-3 servings of fruits and vegetables daily.Col consumes 1 sweetened beverage(s) daily.Col exercises with enough effort to increase Col's heart rate 10 to 19 minutes per day.  Col exercises with enough effort to increase Col's heart rate 3 or less days per week. Col is missing 1 dose(s) of medications per week.  Col is not taking prescribed medications regularly due to remembering to take.      They/Them pronouns. Patient works as a mental health therapist.     Not good with BP monitoring. Has BP cuff at home.Takes metoprolol.    Lost track of weight lost med. Wanted compounded version, pt will be paying out of pocket. Pharmacy has not contacted her.     ADHD medication: takes methylphenidate 36 mg daily. Works well for symptoms, no side effects.    Depression, stable.       6/27/2024    10:44 PM 2/18/2025    11:46 PM 5/21/2025     8:50 AM   PHQ   PHQ-9 Total Score 7 5  4    Q9: Thoughts of better off dead/self-harm past 2 weeks Not at all Not at all Not at all       Patient-reported      BP Readings from Last 6 Encounters:   05/21/25 (!) 150/80   02/19/25 (!) 151/96   06/28/24 126/85   04/26/24 115/77   02/22/24 130/82   02/15/24 (!) 130/93      Wt Readings from Last 4 Encounters:   05/21/25 122.8 kg (270 lb 12.8 oz)   02/19/25 119 kg (262 lb 6.4 oz)   06/28/24 113.4 kg (250 lb)   04/26/24 108.9 kg (240 lb)                    Objective    BP (!) 150/80   Pulse 60   Temp (!) 96.1  F (35.6  C) (Temporal)   Resp 18   Ht 1.753 m (5' 9\")   Wt 122.8 kg (270 lb 12.8 oz)   LMP  (LMP Unknown)   SpO2 97%   BMI 39.99 kg/m    Body mass index is 39.99 kg/m .  Physical Exam   GENERAL: alert and no distress  PSYCH: mentation appears normal, affect normal/bright            Signed Electronically by: Wilber Chapin,     "

## 2025-05-29 ENCOUNTER — PATIENT OUTREACH (OUTPATIENT)
Dept: CARE COORDINATION | Facility: CLINIC | Age: 65
End: 2025-05-29
Payer: COMMERCIAL

## 2025-06-12 ENCOUNTER — VIRTUAL VISIT (OUTPATIENT)
Dept: FAMILY MEDICINE | Facility: CLINIC | Age: 65
End: 2025-06-12
Payer: COMMERCIAL

## 2025-06-12 ENCOUNTER — PATIENT OUTREACH (OUTPATIENT)
Dept: CARE COORDINATION | Facility: CLINIC | Age: 65
End: 2025-06-12

## 2025-06-12 ENCOUNTER — RESULTS FOLLOW-UP (OUTPATIENT)
Dept: FAMILY MEDICINE | Facility: CLINIC | Age: 65
End: 2025-06-12

## 2025-06-12 DIAGNOSIS — I10 PRIMARY HYPERTENSION: Primary | ICD-10-CM

## 2025-06-12 DIAGNOSIS — E66.812 CLASS 2 SEVERE OBESITY WITH SERIOUS COMORBIDITY AND BODY MASS INDEX (BMI) OF 39.0 TO 39.9 IN ADULT, UNSPECIFIED OBESITY TYPE (H): ICD-10-CM

## 2025-06-12 DIAGNOSIS — C55 MALIGNANT NEOPLASM OF UTERUS, UNSPECIFIED SITE (H): ICD-10-CM

## 2025-06-12 DIAGNOSIS — E66.01 CLASS 2 SEVERE OBESITY WITH SERIOUS COMORBIDITY AND BODY MASS INDEX (BMI) OF 39.0 TO 39.9 IN ADULT, UNSPECIFIED OBESITY TYPE (H): ICD-10-CM

## 2025-06-12 NOTE — PROGRESS NOTES
" is a 65 year old who is being evaluated via a billable video visit.          Assessment & Plan     Primary hypertension  -Good home reading with starting losartan. Pt would like to discontinue metoprolol eventually. Discussed checking BP 3-4 times a week and sending me log on mychart in 2-3 weeks. If BP <140/90 we can plan to increase losartan and decrease metoprolol, with the goal of discontinuing metoprolol over the next few weeks.    Class 2 severe obesity with serious comorbidity and body mass index (BMI) of 39.0 to 39.9 in adult, unspecified obesity type (H)  -Patient planning to start Weygovy, paying out of pocket  -Discussed medication side effects, high protein diet, staying hydrated.  -Follow-up 3 months after starting medication, return sooner as neeeded    Malignant neoplasm of uterus, unspecified site (H)  -In remission, s/p total hysterectomy. Pap no longer indicated.            BMI  Estimated body mass index is 39.99 kg/m  as calculated from the following:    Height as of 5/21/25: 1.753 m (5' 9\").    Weight as of 5/21/25: 122.8 kg (270 lb 12.8 oz).             Subjective    is a 65 year old, presenting for the following health issues:  Hypertension (Follow up ) and Recheck Medication (Patient has a form that needs to be filled out to get her Wegovy prescription.  )        6/12/2025     9:56 AM   Additional Questions   Roomed by Yara LOPES   Accompanied by Self         6/12/2025   Forms   Any forms needing to be completed Yes     History of Present Illness       Hypertension:  presents for follow up of hypertension.   does not check blood pressure  regularly outside of the clinic. Outside blood pressures have been over 140/90.  follows a low salt diet.      eats 2-3 servings of fruits and vegetables daily.Col consumes 1 sweetened beverage(s) daily.Col exercises with enough effort to increase Col's heart rate 9 or less minutes per day.  Col exercises with enough effort to increase Col's " heart rate 3 or less days per week.  is missing 1 dose(s) of medications per week.   is not taking prescribed medications regularly due to remembering to take.        HTN: Started on losartan about 4 weeks ago. 125/75 last week. BP monitor broken now. No longer having sensations when she has high BP. No lightheadedness of dizziness.     Weight management: Planning on paying for Weygovy out of pocket. Currently deciding when she wants to start.                Objective           Vitals:  No vitals were obtained today due to virtual visit.    Physical Exam   GENERAL: alert and no distress  EYES: Eyes grossly normal to inspection.  No discharge or erythema, or obvious scleral/conjunctival abnormalities.  RESP: No audible wheeze, cough, or visible cyanosis.    SKIN: Visible skin clear. No significant rash, abnormal pigmentation or lesions.  NEURO: Cranial nerves grossly intact.  Mentation and speech appropriate for age.  PSYCH: Appropriate affect, tone, and pace of words          Video-Visit Details    Type of service:  Video Visit   Originating Location (pt. Location): Home    Distant Location (provider location):  Off-site  Platform used for Video Visit: Bharat  Signed Electronically by: Wilber Chapin DO

## 2025-06-17 ENCOUNTER — TELEPHONE (OUTPATIENT)
Dept: OBGYN | Facility: CLINIC | Age: 65
End: 2025-06-17
Payer: COMMERCIAL

## 2025-06-17 NOTE — TELEPHONE ENCOUNTER
----- Message from Allyssa Diaz sent at 6/17/2025  2:40 PM CDT -----  Regarding: Reschedule 6/19 appt  He team,    Can you please reschedule this pt to be with a doc because she may need a biopsy based on what she is describing. Thanks!    Allyssa

## 2025-06-17 NOTE — TELEPHONE ENCOUNTER
LVM for patient to reschedule 6/19 appt to be with MD team, per Allyssa Diaz's note. They should also reschedule appt to be a New Patient Appt, as they have not been seen at [facility] before.

## 2025-06-19 ENCOUNTER — OFFICE VISIT (OUTPATIENT)
Dept: OBGYN | Facility: CLINIC | Age: 65
End: 2025-06-19
Payer: COMMERCIAL

## 2025-06-19 ENCOUNTER — RESULTS FOLLOW-UP (OUTPATIENT)
Dept: OBGYN | Facility: CLINIC | Age: 65
End: 2025-06-19

## 2025-06-19 VITALS
SYSTOLIC BLOOD PRESSURE: 134 MMHG | HEART RATE: 62 BPM | DIASTOLIC BLOOD PRESSURE: 89 MMHG | HEIGHT: 69 IN | BODY MASS INDEX: 39.99 KG/M2

## 2025-06-19 DIAGNOSIS — N90.89 VULVAL LESION: Primary | ICD-10-CM

## 2025-06-19 DIAGNOSIS — R30.0 DYSURIA: ICD-10-CM

## 2025-06-19 DIAGNOSIS — N90.89 VULVAR LESION: Primary | ICD-10-CM

## 2025-06-19 LAB
ALBUMIN UR-MCNC: NEGATIVE MG/DL
APPEARANCE UR: CLEAR
BILIRUB UR QL STRIP: NEGATIVE
COLOR UR AUTO: YELLOW
GLUCOSE UR STRIP-MCNC: NEGATIVE MG/DL
HGB UR QL STRIP: NEGATIVE
KETONES UR STRIP-MCNC: NEGATIVE MG/DL
LEUKOCYTE ESTERASE UR QL STRIP: ABNORMAL
NITRATE UR QL: NEGATIVE
PH UR STRIP: 6 [PH] (ref 5–8)
SP GR UR STRIP: 1.02 (ref 1–1.03)
UROBILINOGEN UR STRIP-ACNC: 0.2 E.U./DL

## 2025-06-19 PROCEDURE — 81003 URINALYSIS AUTO W/O SCOPE: CPT | Performed by: ADVANCED PRACTICE MIDWIFE

## 2025-06-19 PROCEDURE — 99211 OFF/OP EST MAY X REQ PHY/QHP: CPT | Performed by: ADVANCED PRACTICE MIDWIFE

## 2025-06-19 PROCEDURE — 56606 BIOPSY OF VULVA/PERINEUM: CPT | Performed by: STUDENT IN AN ORGANIZED HEALTH CARE EDUCATION/TRAINING PROGRAM

## 2025-06-19 PROCEDURE — 56605 BIOPSY OF VULVA/PERINEUM: CPT | Performed by: STUDENT IN AN ORGANIZED HEALTH CARE EDUCATION/TRAINING PROGRAM

## 2025-06-19 NOTE — PROGRESS NOTES
"Gyn Clinic Visit Note  6/19/2025    S: Gracie Parra is a 65 year old No obstetric history on file. here today for discoloration and itching to vulva for approximately 4 months. Using clobetasol cream intermittently from PCP. Used consistently for a week and it seemed to help but now irregular use. Now having pain, burning at times, stinging at times. Sits a lot for work so sometimes it gets bothersome while sitting for long periods. Has a bidet and the water touching the skin can be painful. No unusual vaginal discharge, no odor, no urinary symptoms per se although sometimes has the feeling of external urinary burn/urge like a UTI. Sex with her partner has not been possible due to the pain. Feeling more fatigued lately.     Gyn Hx:   No LMP recorded (lmp unknown). Patient has had a hysterectomy.  Menses:   Hysterectomy due to endometrial cancer  Contraception:   N/A  Sexual Activity  1 female partner for past 24yrs   Sexually transmitted disease history:  none  PAP smear history:  N/a due to hysterectomy       O:   /89   Pulse 62   Ht 1.753 m (5' 9\")   LMP  (LMP Unknown)   BMI 39.99 kg/m    This is her BP norm lately, in process of increasing Losartan and weaning off Metoprolol with Dr. Dari MORRISON reviewed with small amt leuks    Pelvic exam: External vulva reveals bruise-like areas scattered throughout inside of labia majora. Two irritated and slightly erythematous areas to each groin    A:  Gracie Parra is a 65 year old y/o No obstetric history on file. here today for discoloration and itching to vulva.     P: UA/UC. Requested Dr. Santillan come to bedside to eval vulvar/vaginal tissue- see her documentation- biopsies collected by her. RTC prn for problems and per MD guidance.    Allyssa Diaz APRKRYSTYNA, CNM        "

## 2025-06-19 NOTE — NURSING NOTE
Chief Complaint   Patient presents with    Vaginal Problem     Discoloration of labia with itching and discomfort

## 2025-06-20 NOTE — PROGRESS NOTES
[facility]  Procedure Visit    S: Col Parra (gender non-binary) is a 65 year old here for vulvar irritation for the past few months. Has been using clobetasol cream intermittently from PCP which has helped, but not using it consistently. Vulva is quite irritated and it is difficult to sit at times. She is unable to have sex with her partner due to pain. She is s/p hysterectomy and has not needed further pap smears since then. Most painful area is the clitoral area.    O:  LMP  (LMP Unknown)   Gen: Well-appearing, NAD  HEENT: Normocephalic, atraumatic  CV:        Well perfused; no LE edema  Pulm:  Normal respiratory effort and rate    Pelvic:  External female genitalia with violaceous lesions bilaterally on labia as well as to the right of the clitoris. Labia minora and clitoral saldivar completely agglutinated. SSE with no evidence of lesions within vagina    Procedure  Risks and benefits of procedure discussed and written consent signed. Patient placed in dorsal lithotomy. The vulva was cleansed with betadine x3. 1% lidocaine plain was injected at the 3 biopsy sites (right mid-labia, left labia, and right periclitoral area). A 3mm punch biopsy was used to biopsy each site. Hemostasis was obtained with silver nitrate.The patient tolerated the procedure well.    A/P:  Col Parra is a 65 year old here for vulvar irritation and lesions.    Vulvar lesions w/ irritation  - Discussed differential including lichen planus given violaceous lesions - though no oral lesions, lichen sclerosus. Need to rule out malignancy  - Biopsies sent for pathology, will MyChart or call patient with results    Doreen Santillan MD  [facility], Ob/Gyn  06/19/2025 8:58 PM

## 2025-06-21 LAB — BACTERIA UR CULT: NORMAL

## 2025-06-30 ENCOUNTER — TELEPHONE (OUTPATIENT)
Dept: OBGYN | Facility: CLINIC | Age: 65
End: 2025-06-30
Payer: COMMERCIAL

## 2025-06-30 ENCOUNTER — RESULTS FOLLOW-UP (OUTPATIENT)
Dept: OBGYN | Facility: CLINIC | Age: 65
End: 2025-06-30

## 2025-06-30 DIAGNOSIS — L90.0 LICHEN SCLEROSUS: Primary | ICD-10-CM

## 2025-06-30 RX ORDER — CLOBETASOL PROPIONATE 0.5 MG/G
OINTMENT TOPICAL AT BEDTIME
Qty: 45 G | Refills: 2 | Status: SHIPPED | OUTPATIENT
Start: 2025-06-30

## 2025-07-07 ENCOUNTER — MYC REFILL (OUTPATIENT)
Dept: FAMILY MEDICINE | Facility: CLINIC | Age: 65
End: 2025-07-07
Payer: COMMERCIAL

## 2025-07-07 DIAGNOSIS — E66.01 MORBID OBESITY (H): ICD-10-CM

## 2025-07-07 NOTE — TELEPHONE ENCOUNTER
Dr. Chapin-please review and advise.  It is writer's understanding patient due for follow up in September 2025.  0.5 mg dose pended.    Thank you!  IRVIN CastanonN, RN-[facility]

## 2025-08-15 ENCOUNTER — TELEPHONE (OUTPATIENT)
Dept: FAMILY MEDICINE | Facility: CLINIC | Age: 65
End: 2025-08-15
Payer: COMMERCIAL

## 2025-08-16 ENCOUNTER — HEALTH MAINTENANCE LETTER (OUTPATIENT)
Age: 65
End: 2025-08-16

## 2025-08-25 ENCOUNTER — OFFICE VISIT (OUTPATIENT)
Dept: OBGYN | Facility: CLINIC | Age: 65
End: 2025-08-25
Attending: STUDENT IN AN ORGANIZED HEALTH CARE EDUCATION/TRAINING PROGRAM
Payer: COMMERCIAL

## 2025-08-25 VITALS
HEART RATE: 61 BPM | SYSTOLIC BLOOD PRESSURE: 151 MMHG | BODY MASS INDEX: 39.99 KG/M2 | HEIGHT: 69 IN | DIASTOLIC BLOOD PRESSURE: 85 MMHG

## 2025-08-25 DIAGNOSIS — L90.0 LICHEN SCLEROSUS: Primary | ICD-10-CM

## 2025-08-25 PROCEDURE — 3079F DIAST BP 80-89 MM HG: CPT | Performed by: STUDENT IN AN ORGANIZED HEALTH CARE EDUCATION/TRAINING PROGRAM

## 2025-08-25 PROCEDURE — 3077F SYST BP >= 140 MM HG: CPT | Performed by: STUDENT IN AN ORGANIZED HEALTH CARE EDUCATION/TRAINING PROGRAM

## 2025-08-25 PROCEDURE — 99213 OFFICE O/P EST LOW 20 MIN: CPT | Performed by: STUDENT IN AN ORGANIZED HEALTH CARE EDUCATION/TRAINING PROGRAM

## (undated) RX ORDER — LIDOCAINE HYDROCHLORIDE 10 MG/ML
INJECTION, SOLUTION INFILTRATION; PERINEURAL
Status: DISPENSED
Start: 2025-06-19